# Patient Record
Sex: FEMALE | Race: WHITE | URBAN - METROPOLITAN AREA
[De-identification: names, ages, dates, MRNs, and addresses within clinical notes are randomized per-mention and may not be internally consistent; named-entity substitution may affect disease eponyms.]

---

## 2020-10-02 ENCOUNTER — HISTORICAL (OUTPATIENT)
Dept: RADIOLOGY | Facility: HOSPITAL | Age: 80
End: 2020-10-02

## 2021-06-02 ENCOUNTER — HISTORICAL (OUTPATIENT)
Dept: INTENSIVE CARE | Facility: HOSPITAL | Age: 81
End: 2021-06-02

## 2021-06-14 ENCOUNTER — HISTORICAL (OUTPATIENT)
Dept: WOUND CARE | Facility: HOSPITAL | Age: 81
End: 2021-06-14

## 2021-06-14 ENCOUNTER — HISTORICAL (OUTPATIENT)
Dept: RADIOLOGY | Facility: HOSPITAL | Age: 81
End: 2021-06-14

## 2022-04-11 ENCOUNTER — HISTORICAL (OUTPATIENT)
Dept: ADMINISTRATIVE | Facility: HOSPITAL | Age: 82
End: 2022-04-11

## 2022-04-29 VITALS
DIASTOLIC BLOOD PRESSURE: 78 MMHG | SYSTOLIC BLOOD PRESSURE: 168 MMHG | HEIGHT: 63 IN | BODY MASS INDEX: 37.57 KG/M2 | WEIGHT: 212.06 LBS

## 2023-07-24 ENCOUNTER — HOSPITAL ENCOUNTER (EMERGENCY)
Facility: HOSPITAL | Age: 83
Discharge: HOME | End: 2023-07-24
Attending: EMERGENCY MEDICINE
Payer: MEDICARE

## 2023-07-24 VITALS
TEMPERATURE: 99 F | HEIGHT: 64 IN | WEIGHT: 200 LBS | DIASTOLIC BLOOD PRESSURE: 74 MMHG | BODY MASS INDEX: 34.15 KG/M2 | HEART RATE: 64 BPM | OXYGEN SATURATION: 97 % | SYSTOLIC BLOOD PRESSURE: 176 MMHG | RESPIRATION RATE: 24 BRPM

## 2023-07-24 DIAGNOSIS — I87.2 VENOUS STASIS DERMATITIS OF BOTH LOWER EXTREMITIES: ICD-10-CM

## 2023-07-24 DIAGNOSIS — S32.058A OTHER CLOSED FRACTURE OF FIFTH LUMBAR VERTEBRA, INITIAL ENCOUNTER: ICD-10-CM

## 2023-07-24 DIAGNOSIS — E86.0 DEHYDRATION: ICD-10-CM

## 2023-07-24 DIAGNOSIS — S09.90XA CLOSED HEAD INJURY, INITIAL ENCOUNTER: ICD-10-CM
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
## 2023-07-24 PROBLEM — S32.009A CLOSED FRACTURE OF SPINOUS PROCESS OF LUMBAR VERTEBRA: Status: ACTIVE | Noted: 2023-07-24
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
## 2023-07-24 LAB
ABORH RETYPE: NORMAL
ALBUMIN SERPL-MCNC: 3.7 G/DL (ref 3.4–4.8)
ALBUMIN/GLOB SERPL: 1.2 RATIO (ref 1.1–2)
ALP SERPL-CCNC: 84 UNIT/L (ref 40–150)
ALT SERPL-CCNC: 21 UNIT/L (ref 0–55)
APTT PPP: 32.3 SECONDS (ref 23.2–33.7)
AST SERPL-CCNC: 19 UNIT/L (ref 5–34)
BASOPHILS # BLD AUTO: 0.02 X10(3)/MCL
BASOPHILS NFR BLD AUTO: 0.3 %
BILIRUBIN DIRECT+TOT PNL SERPL-MCNC: 0.3 MG/DL
BUN SERPL-MCNC: 27.9 MG/DL (ref 9.8–20.1)
CALCIUM SERPL-MCNC: 9.7 MG/DL (ref 8.4–10.2)
CHLORIDE SERPL-SCNC: 104 MMOL/L (ref 98–107)
CO2 SERPL-SCNC: 25 MMOL/L (ref 23–31)
CREAT SERPL-MCNC: 0.99 MG/DL (ref 0.55–1.02)
EOSINOPHIL # BLD AUTO: 0.08 X10(3)/MCL (ref 0–0.9)
EOSINOPHIL NFR BLD AUTO: 1.2 %
ERYTHROCYTE [DISTWIDTH] IN BLOOD BY AUTOMATED COUNT: 14.3 % (ref 11.5–17)
ETHANOL SERPL-MCNC: <10 MG/DL
GFR SERPLBLD CREATININE-BSD FMLA CKD-EPI: 58 MLS/MIN/1.73/M2
GLOBULIN SER-MCNC: 3.1 GM/DL (ref 2.4–3.5)
GLUCOSE SERPL-MCNC: 155 MG/DL (ref 82–115)
GROUP & RH: NORMAL
HCT VFR BLD AUTO: 35.6 % (ref 37–47)
HGB BLD-MCNC: 11.3 G/DL (ref 12–16)
IMM GRANULOCYTES # BLD AUTO: 0.03 X10(3)/MCL (ref 0–0.04)
IMM GRANULOCYTES NFR BLD AUTO: 0.4 %
INDIRECT COOMBS GEL: NORMAL
INR BLD: 1.13 (ref 0–1.3)
LACTATE SERPL-SCNC: 1.4 MMOL/L (ref 0.5–2.2)
LACTATE SERPL-SCNC: 2.5 MMOL/L (ref 0.5–2.2)
LACTATE SERPL-SCNC: 2.6 MMOL/L (ref 0.5–2.2)
LYMPHOCYTES # BLD AUTO: 1.33 X10(3)/MCL (ref 0.6–4.6)
LYMPHOCYTES NFR BLD AUTO: 19.6 %
MCH RBC QN AUTO: 27.3 PG (ref 27–31)
MCHC RBC AUTO-ENTMCNC: 31.7 G/DL (ref 33–36)
MCV RBC AUTO: 86 FL (ref 80–94)
MONOCYTES # BLD AUTO: 0.46 X10(3)/MCL (ref 0.1–1.3)
MONOCYTES NFR BLD AUTO: 6.8 %
NEUTROPHILS # BLD AUTO: 4.85 X10(3)/MCL (ref 2.1–9.2)
NEUTROPHILS NFR BLD AUTO: 71.7 %
NRBC BLD AUTO-RTO: 0 %
PLATELET # BLD AUTO: 305 X10(3)/MCL (ref 130–400)
PMV BLD AUTO: 9.2 FL (ref 7.4–10.4)
POTASSIUM SERPL-SCNC: 4.4 MMOL/L (ref 3.5–5.1)
PROT SERPL-MCNC: 6.8 GM/DL (ref 5.8–7.6)
PROTHROMBIN TIME: 14.4 SECONDS (ref 12.5–14.5)
RBC # BLD AUTO: 4.14 X10(6)/MCL (ref 4.2–5.4)
SODIUM SERPL-SCNC: 139 MMOL/L (ref 136–145)
SPECIMEN OUTDATE: NORMAL
WBC # SPEC AUTO: 6.77 X10(3)/MCL (ref 4.5–11.5)

## 2023-07-24 PROCEDURE — 80053 COMPREHEN METABOLIC PANEL: CPT | Performed by: EMERGENCY MEDICINE

## 2023-07-24 PROCEDURE — 96361 HYDRATE IV INFUSION ADD-ON: CPT

## 2023-07-24 PROCEDURE — 83605 ASSAY OF LACTIC ACID: CPT | Mod: 59 | Performed by: EMERGENCY MEDICINE

## 2023-07-24 PROCEDURE — G0390 TRAUMA RESPONS W/HOSP CRITI: HCPCS

## 2023-07-24 PROCEDURE — 63600175 PHARM REV CODE 636 W HCPCS: Performed by: EMERGENCY MEDICINE

## 2023-07-24 PROCEDURE — 85025 COMPLETE CBC W/AUTO DIFF WBC: CPT | Performed by: EMERGENCY MEDICINE

## 2023-07-24 PROCEDURE — 82077 ASSAY SPEC XCP UR&BREATH IA: CPT | Performed by: EMERGENCY MEDICINE

## 2023-07-24 PROCEDURE — 99285 EMERGENCY DEPT VISIT HI MDM: CPT | Mod: 25

## 2023-07-24 PROCEDURE — 86900 BLOOD TYPING SEROLOGIC ABO: CPT | Performed by: EMERGENCY MEDICINE

## 2023-07-24 PROCEDURE — 96365 THER/PROPH/DIAG IV INF INIT: CPT

## 2023-07-24 PROCEDURE — 25000003 PHARM REV CODE 250: Performed by: EMERGENCY MEDICINE

## 2023-07-24 PROCEDURE — 85610 PROTHROMBIN TIME: CPT | Performed by: EMERGENCY MEDICINE

## 2023-07-24 PROCEDURE — 85730 THROMBOPLASTIN TIME PARTIAL: CPT | Performed by: EMERGENCY MEDICINE

## 2023-07-24 RX ORDER — NAPROXEN 500 MG/1
500 TABLET ORAL 2 TIMES DAILY PRN
Qty: 20 TABLET | Refills: 0 | Status: SHIPPED | OUTPATIENT
Start: 2023-07-24

## 2023-07-24 RX ORDER — ACETAMINOPHEN 10 MG/ML
1000 INJECTION, SOLUTION INTRAVENOUS ONCE
Status: COMPLETED | OUTPATIENT
Start: 2023-07-24 | End: 2023-07-24

## 2023-07-24 RX ORDER — SODIUM CHLORIDE 9 MG/ML
1000 INJECTION, SOLUTION INTRAVENOUS
Status: DISCONTINUED | OUTPATIENT
Start: 2023-07-24 | End: 2023-07-24

## 2023-07-24 RX ORDER — HYDROCODONE BITARTRATE AND ACETAMINOPHEN 5; 325 MG/1; MG/1
1 TABLET ORAL EVERY 6 HOURS PRN
Qty: 8 TABLET | Refills: 0 | Status: SHIPPED | OUTPATIENT
Start: 2023-07-24

## 2023-07-24 RX ORDER — HYDROCODONE BITARTRATE AND ACETAMINOPHEN 5; 325 MG/1; MG/1
1 TABLET ORAL
Status: COMPLETED | OUTPATIENT
Start: 2023-07-24 | End: 2023-07-24

## 2023-07-24 RX ADMIN — SODIUM CHLORIDE 500 ML: 9 INJECTION, SOLUTION INTRAVENOUS at 11:07

## 2023-07-24 RX ADMIN — SODIUM CHLORIDE 1000 ML: 9 INJECTION, SOLUTION INTRAVENOUS at 12:07

## 2023-07-24 RX ADMIN — HYDROCODONE BITARTRATE AND ACETAMINOPHEN 1 TABLET: 5; 325 TABLET ORAL at 04:07

## 2023-07-24 RX ADMIN — ACETAMINOPHEN 1000 MG: 10 INJECTION, SOLUTION INTRAVENOUS at 09:07

## 2023-07-24 NOTE — CONSULTS
[facility] - Emergency Dept  Trauma Surgery  Consult Note    Patient Name: Nicolasa Montano  MRN: [id number]  Code Status: No Order  Admission Date: 7/24/2023  Hospital Length of Stay: 0 days  Attending Physician: Sapna Barry MD  Primary Care Provider: Primary Doctor No    Patient information was obtained from patient and ER records.     Consults  Subjective:     Principal Problem: <principal problem not specified>    History of Present Illness: 81-year-old female who tripped and fell last night presented as a level 2 trauma activation.  Patient does take Plavix although the indication is not completely clear at this time.  She complains of tailbone pain.  She was no deformity or step-off it was slightly tender in that area.  Her exam is otherwise normal.  She was no signs of trauma of the head.  She does not report loss of consciousness.      No current facility-administered medications on file prior to encounter.     No current outpatient medications on file prior to encounter.       Review of patient's allergies indicates:  No Known Allergies    History reviewed. No pertinent past medical history.  History reviewed. No pertinent surgical history.  Family History    None       Tobacco Use    Smoking status: Not on file    Smokeless tobacco: Not on file   Substance and Sexual Activity    Alcohol use: Not on file    Drug use: Not on file    Sexual activity: Not on file     Review of Systems   Constitutional:  Negative for chills and fever.   HENT:  Negative for ear pain and trouble swallowing.    Eyes:  Negative for pain and redness.   Respiratory:  Negative for cough and chest tightness.    Cardiovascular:  Negative for chest pain, palpitations and leg swelling.   Gastrointestinal:  Negative for abdominal distention, abdominal pain, nausea and vomiting.   Genitourinary:  Negative for difficulty urinating.   Musculoskeletal:  Positive for back pain. Negative for neck pain.   Skin:   Negative for color change, pallor and wound.   Neurological:  Negative for dizziness, syncope, speech difficulty, weakness, light-headedness, numbness and headaches.   Psychiatric/Behavioral:  Negative for agitation and suicidal ideas.    All other systems reviewed and are negative.  Objective:     Vital Signs (Most Recent):  Temp: 98.7 °F (37.1 °C) (07/24/23 1010)  Pulse: 62 (07/24/23 1010)  Resp: 19 (07/24/23 1010)  BP: (!) 176/72 (07/24/23 1010)  SpO2: 97 % (07/24/23 1010) Vital Signs (24h Range):  Temp:  [97.4 °F (36.3 °C)-98.7 °F (37.1 °C)] 98.7 °F (37.1 °C)  Pulse:  [62-80] 62  Resp:  [16-19] 19  SpO2:  [97 %-98 %] 97 %  BP: (127-176)/(72-83) 176/72     Weight: 90.7 kg (200 lb)  Body mass index is 34.33 kg/m².     Physical Exam  Constitutional:       Appearance: Normal appearance.   HENT:      Head: Normocephalic and atraumatic.      Nose: Nose normal.   Eyes:      Pupils: Pupils are equal, round, and reactive to light.   Cardiovascular:      Rate and Rhythm: Normal rate.      Pulses: Normal pulses.      Comments: Normal peripheral pulses  Pulmonary:      Effort: Pulmonary effort is normal. No respiratory distress.   Chest:      Chest wall: No tenderness.   Abdominal:      General: Abdomen is flat. Bowel sounds are normal. There is no distension.      Palpations: Abdomen is soft.      Tenderness: There is no abdominal tenderness.   Musculoskeletal:         General: Tenderness present. No swelling, deformity or signs of injury.      Cervical back: Normal range of motion and neck supple. No tenderness.   Skin:     General: Skin is warm and dry.      Capillary Refill: Capillary refill takes less than 2 seconds.      Findings: No lesion.   Neurological:      General: No focal deficit present.      Mental Status: She is alert and oriented to person, place, and time. Mental status is at baseline.   Psychiatric:         Mood and Affect: Mood normal.         Behavior: Behavior normal.         Thought Content: Thought  content normal.         Judgment: Judgment normal.          I have reviewed all pertinent lab results within the past 24 hours.    Significant Diagnostics:  I have reviewed all pertinent imaging results/findings within the past 24 hours.      Assessment/Plan:     Closed fracture of spinous process of lumbar vertebra  Cleared from trauma standpoint for dispositioned by emergency department physician.  Imaging reviewed.  Labs reviewed.      VTE Risk Mitigation (From admission, onward)    None          Thank you for your consult. I will sign off. Please contact us if you have any additional questions.    Gordon Trinidad, MIKE  Trauma Surgery  [facility] - Emergency Dept

## 2023-07-24 NOTE — HPI
81-year-old female who tripped and fell last night presented as a level 2 trauma activation.  Patient does take Plavix although the indication is not completely clear at this time.  She complains of tailbone pain.  She was no deformity or step-off it was slightly tender in that area.  Her exam is otherwise normal.  She was no signs of trauma of the head.  She does not report loss of consciousness.

## 2023-07-24 NOTE — ED PROVIDER NOTES
Encounter Date: 7/24/2023    SCRIBE #1 NOTE: I, Gordon Mcmullen, am scribing for, and in the presence of,  Sapna Barry MD. I have scribed the following portions of the note - Other sections scribed: HPI, ROS, PE.     History   No chief complaint on file.    80 y/o female presents to the ED via EMS as a Level 2 trauma following a fall yesterday night. EMS reports that fell backwards in the bathroom and hit the back of her head on the floor, no LOC. She was reported to be altered today with an abnormal gait. Pt reports tailbone pain rated 9/10, unsure if she hit it when she fell. She notes that the fall occurred because she lost her balance. Does have a history of falls. On thinners.    The history is provided by the EMS personnel and the patient. No  was used.   Review of patient's allergies indicates:  No Known Allergies  History reviewed. No pertinent past medical history.  History reviewed. No pertinent surgical history.  History reviewed. No pertinent family history.     Review of Systems   Constitutional:  Negative for activity change, diaphoresis, fatigue and fever.   HENT:  Negative for congestion, postnasal drip, rhinorrhea, sinus pain, sneezing and sore throat.    Respiratory:  Negative for cough, chest tightness, shortness of breath and wheezing.    Cardiovascular:  Negative for chest pain, palpitations and leg swelling.   Gastrointestinal:  Negative for abdominal distention, abdominal pain and blood in stool.   Genitourinary:  Negative for decreased urine volume, difficulty urinating and dysuria.   Musculoskeletal:         Tailbone pain   Skin:  Negative for color change and pallor.   Neurological:  Negative for dizziness, speech difficulty, weakness, light-headedness and numbness.   All other systems reviewed and are negative.    Physical Exam     Initial Vitals [07/24/23 0943]   BP Pulse Resp Temp SpO2   (!) 171/83 80 19 98.4 °F (36.9 °C) 97 %      MAP       --         Physical  Exam    Nursing note and vitals reviewed.  Constitutional: She appears well-developed and well-nourished. She is not diaphoretic. No distress. Cervical collar in place.   Bilateral breath sounds     HENT:   Head: Normocephalic and atraumatic.   Nose: Nose normal.   Mouth/Throat: Oropharynx is clear and moist.   Eyes: Conjunctivae and EOM are normal. Pupils are equal, round, and reactive to light.   Pupils 3 mm   Neck: Trachea normal.   C-collar replaced   Cardiovascular:  Normal rate, regular rhythm, normal heart sounds and intact distal pulses.           No murmur heard.  Pulmonary/Chest: Breath sounds normal. No respiratory distress. She has no wheezes. She has no rhonchi. She has no rales. She exhibits no tenderness.   Abdominal: Abdomen is soft. Bowel sounds are normal. She exhibits no distension and no mass. There is no abdominal tenderness. There is no rebound and no guarding.   Musculoskeletal:         General: Tenderness present. No edema. Normal range of motion.      Lumbar back: Normal. Normal range of motion.      Comments: Sacral tenderness  No step offs or deformities to the back     Neurological: She is alert and oriented to person, place, and time. She has normal strength. No cranial nerve deficit or sensory deficit. GCS score is 15. GCS eye subscore is 4. GCS verbal subscore is 5. GCS motor subscore is 6.   Skin: Skin is warm and dry. Capillary refill takes less than 2 seconds. No abscess noted. No pallor.   Stasis dermatitis to BLE   Psychiatric: She has a normal mood and affect. Her behavior is normal. Judgment and thought content normal.       ED Course   Procedures  Labs Reviewed   COMPREHENSIVE METABOLIC PANEL - Abnormal; Notable for the following components:       Result Value    Glucose Level 155 (*)     Blood Urea Nitrogen 27.9 (*)     All other components within normal limits   LACTIC ACID, PLASMA - Abnormal; Notable for the following components:    Lactic Acid Level 2.5 (*)     All other  components within normal limits   CBC WITH DIFFERENTIAL - Abnormal; Notable for the following components:    RBC 4.14 (*)     Hgb 11.3 (*)     Hct 35.6 (*)     MCHC 31.7 (*)     All other components within normal limits   LACTIC ACID, PLASMA - Abnormal; Notable for the following components:    Lactic Acid Level 2.6 (*)     All other components within normal limits   PROTIME-INR - Normal   APTT - Normal   ALCOHOL,MEDICAL (ETHANOL) - Normal   LACTIC ACID, PLASMA - Normal   CBC W/ AUTO DIFFERENTIAL    Narrative:     The following orders were created for panel order CBC auto differential.  Procedure                               Abnormality         Status                     ---------                               -----------         ------                     CBC with Differential[[id number]]        Abnormal            Final result                 Please view results for these tests on the individual orders.   TYPE & SCREEN   ABORH RETYPE          Imaging Results              X-Ray Pelvis Routine AP (Final result)  Result time 07/24/23 10:41:01      Final result by Lawrence Montes De Oca MD (07/24/23 10:41:01)                   Impression:      1. No definite acute displaced fracture or dislocation identified.      Electronically signed by: Lawrence Montes De Oca MD  Date:    07/24/2023  Time:    10:41               Narrative:    EXAMINATION:  XR PELVIS ROUTINE AP    CLINICAL HISTORY:  Pelvic trauma.    TECHNIQUE:  AP view of the pelvis was performed.    COMPARISON:  None.    FINDINGS:  No acute displaced fracture, subluxation, or dislocation identified.  There are suspected chronic healed fractures of the bilateral superior and inferior pubic rami.  There are degenerative changes noted of the bilateral hips.  There is vascular stent material present.                                       X-Ray Chest 1 View (Final result)  Result time 07/24/23 10:39:24      Final result by Lawrence Montes De Oca MD (07/24/23 10:39:24)                    Impression:      1. There are diffuse bilateral interstitial opacities identified.  Correlate for pulmonary edema.  Differential would include chronic interstitial change or infection.  No prior imaging available for comparison.      Electronically signed by: Lawrence Montes De Oca MD  Date:    07/24/2023  Time:    10:39               Narrative:    EXAMINATION:  XR CHEST 1 VIEW    CLINICAL HISTORY:  Trauma.    TECHNIQUE:  1 view of the chest.    COMPARISON:  None available.    FINDINGS:  There are diffuse bilateral interstitial opacities.  Correlate for pulmonary edema.  Chronic interstitial changes or infection would be within the differential.  No prior imaging available for comparison..  There is no pneumothorax.  There is no significant pleural fluid identified.  Vascular calcifications are present.  The cardiac silhouette is enlarged.    No acute displaced fracture is seen.  There is partially visualized cervical fusion hardware.                                       CT Head Without Contrast (Final result)  Result time 07/24/23 10:15:17      Final result by Arnulfo Cosby MD (07/24/23 10:15:17)                   Impression:      No acute intracranial findings identified.      Electronically signed by: Arnulfo Cosby  Date:    07/24/2023  Time:    10:15               Narrative:    EXAMINATION:  CT HEAD WITHOUT CONTRAST    CLINICAL HISTORY:  Trauma;    TECHNIQUE:  Sequential axial images were performed of the brain without contrast.    Dose product length of 1226 mGycm. Automated exposure control was utilized to minimize radiation dose.    COMPARISON:  None.    FINDINGS:  There is no intracranial mass effect, midline shift, hydrocephalus or hemorrhage. There is no sulcal effacement or low attenuation changes to suggest recent large vessel territory infarction. Chronic appearing periventricular and subcortical white matter low attenuation changes are present and are consistent with chronic microangiopathic ischemia. The  ventricular system and sulcal markings prominence is consistent with atrophy. There is no acute extra axial fluid collection.  There is no acute depressed calvarial fracture.  Visualized paranasal sinuses are clear without mucosal thickening, polypoidal abnormality or air-fluid levels. Mastoid air cells aeration is optimal.                                       CT Lumbar Spine Without Contrast (Final result)  Result time 07/24/23 10:27:17      Final result by Arnulfo Cosby MD (07/24/23 10:27:17)                   Impression:      1. Fracture L5 spinous process with mild adjacent paraspinal soft tissue inflammations.    2. Degenerative disc disease and spondylosis level by level discussed above.      Electronically signed by: Arnulfo Cosby  Date:    07/24/2023  Time:    10:27               Narrative:    EXAMINATION:  CT LUMBAR SPINE WITHOUT CONTRAST    CLINICAL HISTORY:  Low back pain, increased fracture risk;Trauma. Fall on blood thinners. Pt reports sacral tendernes on palpation.;    TECHNIQUE:  Multidetector axial images were performed of the lumbar spine without contrast and the images were reformatted.    Dose length product of 119 mGycm. Automated exposure control was utilized to minimize radiation dose.    COMPARISON:  None available    FINDINGS:  There is Schmorl node defect causing mild compression along the superior endplate of L1.  Otherwise, lumbar vertebrae stature preserved and alignment is unremarkable.  There is fracture deformity of the spinous process of L5 with mild adjacent soft tissue inflammations on image 90 series 6.  No acute fracture or malalignment identified.  Right kidney cortical scarring.  Calcified plaques of the aorta.  Left iliac stent graft.  Disc segmental analysis is given below:    At L1-L2, disc height is preserved.  Central canal is not stenosed and there are no narrowings of the neural foramen.    At L2-L3, there is bulging of annulus fibrosis which slightly indents the  ventral thecal sac.  Central canal is not stenosed.  There are no narrowings of the neural foramen.    At L3-L4, there is generalized disc bulge which compresses the ventral thecal sac.  There is also ligamentum flavum thickening and facet arthropathy.  These findings combine to cause moderate central canal stenosis.  There are no narrowings of the neural foramen.    At L4-L5, there is broad disc protrusion, ligamentum flavum thickening and facet arthropathy resulting in marked central canal stenosis.  Bilateral effacement of the lateral recesses.  There is mild narrowing of the proximal right neural foramen and moderate narrowing of proximal left neural foramen.    At L5-S1, disc height is lost with vacuum disc phenomena.  There is generalized disc bulge which mildly compresses the ventral thecal sac which is flattened.  Bilateral facet arthropathy and ligamentum flavum thickening.  Central canal stenosis is moderate.  There is mild narrowing of the proximal right neural foramen.  The left neural foramen is patent.                                       CT Cervical Spine Without Contrast (Final result)  Result time 07/24/23 10:18:48      Final result by Arnulfo Cosby MD (07/24/23 10:18:48)                   Impression:      No acute fracture or malalignment identified.      Electronically signed by: Arnulfo Cosby  Date:    07/24/2023  Time:    10:18               Narrative:    EXAMINATION:  CT CERVICAL SPINE WITHOUT CONTRAST    CLINICAL HISTORY:  Trauma.    TECHNIQUE:  Multidetector axial images were performed of the cervical spine without and.  Images were reconstructed.    Automated exposure control was utilized to minimize radiation dose.  DLP 1226.    COMPARISON:  None available.    FINDINGS:  Patient is status post solid ACDF from C4 through C7.  There is trace of grade 1 degenerative anterolisthesis of C7 on T1.  Chronic irregularity of the inferior endplate of C3.  Otherwise, cervical vertebrae stature is  preserved.  No acute fracture or malalignment identified.  There is posterior vertebral spondylosis at C3-C4 without significant central canal stenosis or narrowings of the neural foramen. There is no prevertebral soft tissue prominence.    This study does not exclude the possibility of intrathecal soft tissue, ligamentous or vascular injury.                                       Medications   acetaminophen 1,000 mg/100 mL (10 mg/mL) injection 1,000 mg (0 mg Intravenous Stopped 7/24/23 1011)   sodium chloride 0.9% bolus 1,000 mL 1,000 mL (0 mLs Intravenous Stopped 7/24/23 1345)   HYDROcodone-acetaminophen 5-325 mg per tablet 1 tablet (1 tablet Oral Given 7/24/23 1613)     Medical Decision Making:   History:   I obtained history from: EMS provider.  Initial Assessment:   See hpi  Independently Interpreted Test(s):   I have ordered and independently interpreted X-rays - see prior notes.  Clinical Tests:   Lab Tests: Ordered and Reviewed  Radiological Study: Ordered and Reviewed  Other:   I have discussed this case with another health care provider.        Scribe Attestation:   Scribe #1: I performed the above scribed service and the documentation accurately describes the services I performed. I attest to the accuracy of the note.  Comments: Attending:   Physician Attestation Statement for Scribe #1: I, Sapna Barry MD, personally performed the services described in this documentation. All medical record entries made by the scribe were at my direction and in my presence.  I have reviewed the chart and agree that the record reflects my personal performance and is accurate and complete.        Attending Attestation:           Physician Attestation for Scribe:  Physician Attestation Statement for Scribe #1: I, Sapna Barry MD, reviewed documentation, as scribed by Gordon Mcmullen in my presence, and it is both accurate and complete.       Medical Decision Making  The differential diagnosis includes, but is not  limited to: intracranial hemorrhage, skull fracture, concussion, spinal fracture.  Cbc, cmp, lactic, ua, coags, ct head, c spine, cxr, lumbar spine ct  ordered and reviewed  Xr read as possible chronic scarring vs edema vs infection, she has no ss any. Labs other than lactic unremarkable, she has florentin bdominal pain or complaints. L spine with spinous fracture, will place lso for comfort  Given small amount of ivf with clearance of lactic acidosis, had lengthy discussion with patient and her daughter regarding her presentation and they state she has truly only had 4 falls in 2 years due to her neuropathy, they deny any recent concerning symptoms, she is neuro intact. Will f/u wit hpcp    Problems Addressed:  Closed head injury, initial encounter: acute illness or injury that poses a threat to life or bodily functions  Dehydration: acute illness or injury that poses a threat to life or bodily functions  Other closed fracture of fifth lumbar vertebra, initial encounter: acute illness or injury that poses a threat to life or bodily functions  Other slipping, tripping and stumbling without falling, initial encounter: acute illness or injury that poses a threat to life or bodily functions  Venous stasis dermatitis of both lower extremities: chronic illness or injury    Amount and/or Complexity of Data Reviewed  Independent Historian: EMS  Labs: ordered.  Radiology: ordered and independent interpretation performed.    Risk  OTC drugs.  Prescription drug management.           ED Course as of 07/25/23 0557   Mon Jul 24, 2023   1019 Lactate, Danial(!): 2.5  She has no abdominal tenderness or any abdominal injury, she has had no hemodynamic instability, will give small ivf bolus [BS]   1057 Collar cleared, feels quite well [BS]   1058 Pain improved with tylenol [BS]   1210 Resting comfortably, needs to urinate, awaiting lso brace [BS]   1230 She clinically does not have signs of overload or pneumonia. Her cxr is not very  impressive  [BS]   1243 Discussed with family who agrees with plan [BS]   1533 Lactic acid has normalized. Will proceed with discharge as planned  [KM]      ED Course User Index  [BS] Sapna Barry MD  [KM] Juliana Segovia MD                   Clinical Impression:   Final diagnoses:  [W18.49XA] Other slipping, tripping and stumbling without falling, initial encounter (Primary)  [S32.058A] Other closed fracture of fifth lumbar vertebra, initial encounter  [E86.0] Dehydration  [S09.90XA] Closed head injury, initial encounter  [I87.2] Venous stasis dermatitis of both lower extremities        ED Disposition Condition    Discharge Stable          ED Prescriptions       Medication Sig Dispense Start Date End Date Auth. Provider    HYDROcodone-acetaminophen (NORCO) 5-325 mg per tablet Take 1 tablet by mouth every 6 (six) hours as needed for Pain (severe pain only). 8 tablet 7/24/2023 -- Sapna Barry MD    naproxen (NAPROSYN) 500 MG tablet Take 1 tablet (500 mg total) by mouth 2 (two) times daily as needed (take with food or milk for mild-moderate pain). 20 tablet 7/24/2023 -- Sapna Barry MD          Follow-up Information       Follow up With Specialties Details Why Contact Info    your primary care provider  Schedule an appointment as soon as possible for a visit       [facility] - Emergency Dept Emergency Medicine  As needed, If symptoms worsen [address] [address]  [phone]             Sapna Barry MD  07/25/23 0557

## 2023-07-24 NOTE — SUBJECTIVE & OBJECTIVE
Encounter still open from 11/14/17. Please review. No current facility-administered medications on file prior to encounter.     No current outpatient medications on file prior to encounter.       Review of patient's allergies indicates:  No Known Allergies    History reviewed. No pertinent past medical history.  History reviewed. No pertinent surgical history.  Family History    None       Tobacco Use    Smoking status: Not on file    Smokeless tobacco: Not on file   Substance and Sexual Activity    Alcohol use: Not on file    Drug use: Not on file    Sexual activity: Not on file     Review of Systems   Constitutional:  Negative for chills and fever.   HENT:  Negative for ear pain and trouble swallowing.    Eyes:  Negative for pain and redness.   Respiratory:  Negative for cough and chest tightness.    Cardiovascular:  Negative for chest pain, palpitations and leg swelling.   Gastrointestinal:  Negative for abdominal distention, abdominal pain, nausea and vomiting.   Genitourinary:  Negative for difficulty urinating.   Musculoskeletal:  Positive for back pain. Negative for neck pain.   Skin:  Negative for color change, pallor and wound.   Neurological:  Negative for dizziness, syncope, speech difficulty, weakness, light-headedness, numbness and headaches.   Psychiatric/Behavioral:  Negative for agitation and suicidal ideas.    All other systems reviewed and are negative.  Objective:     Vital Signs (Most Recent):  Temp: 98.7 °F (37.1 °C) (07/24/23 1010)  Pulse: 62 (07/24/23 1010)  Resp: 19 (07/24/23 1010)  BP: (!) 176/72 (07/24/23 1010)  SpO2: 97 % (07/24/23 1010) Vital Signs (24h Range):  Temp:  [97.4 °F (36.3 °C)-98.7 °F (37.1 °C)] 98.7 °F (37.1 °C)  Pulse:  [62-80] 62  Resp:  [16-19] 19  SpO2:  [97 %-98 %] 97 %  BP: (127-176)/(72-83) 176/72     Weight: 90.7 kg (200 lb)  Body mass index is 34.33 kg/m².     Physical Exam  Constitutional:       Appearance: Normal appearance.   HENT:      Head: Normocephalic and atraumatic.      Nose: Nose normal.   Eyes:       Pupils: Pupils are equal, round, and reactive to light.   Cardiovascular:      Rate and Rhythm: Normal rate.      Pulses: Normal pulses.      Comments: Normal peripheral pulses  Pulmonary:      Effort: Pulmonary effort is normal. No respiratory distress.   Chest:      Chest wall: No tenderness.   Abdominal:      General: Abdomen is flat. Bowel sounds are normal. There is no distension.      Palpations: Abdomen is soft.      Tenderness: There is no abdominal tenderness.   Musculoskeletal:         General: Tenderness present. No swelling, deformity or signs of injury.      Cervical back: Normal range of motion and neck supple. No tenderness.   Skin:     General: Skin is warm and dry.      Capillary Refill: Capillary refill takes less than 2 seconds.      Findings: No lesion.   Neurological:      General: No focal deficit present.      Mental Status: She is alert and oriented to person, place, and time. Mental status is at baseline.   Psychiatric:         Mood and Affect: Mood normal.         Behavior: Behavior normal.         Thought Content: Thought content normal.         Judgment: Judgment normal.          I have reviewed all pertinent lab results within the past 24 hours.    Significant Diagnostics:  I have reviewed all pertinent imaging results/findings within the past 24 hours.

## 2023-07-24 NOTE — ASSESSMENT & PLAN NOTE
Cleared from trauma standpoint for dispositioned by emergency department physician.  Imaging reviewed.  Labs reviewed.

## 2024-08-14 ENCOUNTER — HOSPITAL ENCOUNTER (INPATIENT)
Facility: HOSPITAL | Age: 84
LOS: 5 days | Discharge: NURSING, REHAB OR LONG-TERM CARE | DRG: 964 | End: 2024-08-19
Attending: EMERGENCY MEDICINE | Admitting: SURGERY
Payer: MEDICARE

## 2024-08-14 DIAGNOSIS — S06.5X1A TRAUMATIC SUBDURAL HEMATOMA WITH LOSS OF CONSCIOUSNESS OF 30 MINUTES OR LESS, INITIAL ENCOUNTER: Primary | ICD-10-CM

## 2024-08-14 DIAGNOSIS — S06.6X1A TRAUMATIC SUBARACHNOID HEMORRHAGE WITH LOSS OF CONSCIOUSNESS OF 30 MINUTES OR LESS, INITIAL ENCOUNTER: ICD-10-CM

## 2024-08-14 DIAGNOSIS — S22.41XA CLOSED FRACTURE OF MULTIPLE RIBS OF RIGHT SIDE, INITIAL ENCOUNTER: ICD-10-CM

## 2024-08-14 DIAGNOSIS — H05.231 PERIORBITAL HEMATOMA OF RIGHT EYE: ICD-10-CM

## 2024-08-14 DIAGNOSIS — J93.9 PNEUMOTHORAX, RIGHT: ICD-10-CM

## 2024-08-14 PROBLEM — S22.41XD CLOSED FRACTURE OF MULTIPLE RIBS OF RIGHT SIDE WITH ROUTINE HEALING: Status: ACTIVE | Noted: 2024-08-14

## 2024-08-14 PROBLEM — S06.5XAA SDH (SUBDURAL HEMATOMA): Status: ACTIVE | Noted: 2024-08-14

## 2024-08-14 PROBLEM — I10 HYPERTENSION: Status: ACTIVE | Noted: 2024-08-14

## 2024-08-14 PROBLEM — I60.9 SAH (SUBARACHNOID HEMORRHAGE): Status: ACTIVE | Noted: 2024-08-14

## 2024-08-14 PROBLEM — S42.001D: Status: ACTIVE | Noted: 2024-08-14

## 2024-08-14 LAB
ABORH RETYPE: NORMAL
ALBUMIN SERPL-MCNC: 3.7 G/DL (ref 3.4–4.8)
ALBUMIN/GLOB SERPL: 0.9 RATIO (ref 1.1–2)
ALP SERPL-CCNC: 85 UNIT/L (ref 40–150)
ALT SERPL-CCNC: 18 UNIT/L (ref 0–55)
AMPHET UR QL SCN: NEGATIVE
ANION GAP SERPL CALC-SCNC: 12 MEQ/L
APTT PPP: 28.7 SECONDS (ref 23.2–33.7)
AST SERPL-CCNC: 26 UNIT/L (ref 5–34)
BACTERIA #/AREA URNS AUTO: ABNORMAL /HPF
BARBITURATE SCN PRESENT UR: NEGATIVE
BASOPHILS # BLD AUTO: 0.04 X10(3)/MCL
BASOPHILS NFR BLD AUTO: 0.3 %
BENZODIAZ UR QL SCN: NEGATIVE
BILIRUB SERPL-MCNC: 0.3 MG/DL
BILIRUB UR QL STRIP.AUTO: NEGATIVE
BUN SERPL-MCNC: 30 MG/DL (ref 9.8–20.1)
CALCIUM SERPL-MCNC: 9.9 MG/DL (ref 8.4–10.2)
CANNABINOIDS UR QL SCN: NEGATIVE
CHLORIDE SERPL-SCNC: 108 MMOL/L (ref 98–107)
CK SERPL-CCNC: 388 U/L (ref 29–168)
CLARITY UR: CLEAR
CO2 SERPL-SCNC: 21 MMOL/L (ref 23–31)
COCAINE UR QL SCN: NEGATIVE
COLOR UR AUTO: ABNORMAL
CREAT SERPL-MCNC: 1.36 MG/DL (ref 0.55–1.02)
CREAT/UREA NIT SERPL: 22
EOSINOPHIL # BLD AUTO: 0.08 X10(3)/MCL (ref 0–0.9)
EOSINOPHIL NFR BLD AUTO: 0.6 %
ERYTHROCYTE [DISTWIDTH] IN BLOOD BY AUTOMATED COUNT: 13.4 % (ref 11.5–17)
ETHANOL SERPL-MCNC: <10 MG/DL
FENTANYL UR QL SCN: POSITIVE
GFR SERPLBLD CREATININE-BSD FMLA CKD-EPI: 39 ML/MIN/1.73/M2
GLOBULIN SER-MCNC: 3.9 GM/DL (ref 2.4–3.5)
GLUCOSE SERPL-MCNC: 107 MG/DL (ref 82–115)
GLUCOSE UR QL STRIP: NORMAL
GROUP & RH: NORMAL
HCT VFR BLD AUTO: 37 % (ref 37–47)
HGB BLD-MCNC: 12.1 G/DL (ref 12–16)
HGB UR QL STRIP: ABNORMAL
IMM GRANULOCYTES # BLD AUTO: 0.27 X10(3)/MCL (ref 0–0.04)
IMM GRANULOCYTES NFR BLD AUTO: 1.9 %
INDIRECT COOMBS: NORMAL
INR PPP: 1
KETONES UR QL STRIP: NEGATIVE
LACTATE SERPL-SCNC: 1.9 MMOL/L (ref 0.5–2.2)
LEUKOCYTE ESTERASE UR QL STRIP: 500
LYMPHOCYTES # BLD AUTO: 1.58 X10(3)/MCL (ref 0.6–4.6)
LYMPHOCYTES NFR BLD AUTO: 11.1 %
MCH RBC QN AUTO: 28.9 PG (ref 27–31)
MCHC RBC AUTO-ENTMCNC: 32.7 G/DL (ref 33–36)
MCV RBC AUTO: 88.3 FL (ref 80–94)
MDMA UR QL SCN: NEGATIVE
MONOCYTES # BLD AUTO: 0.55 X10(3)/MCL (ref 0.1–1.3)
MONOCYTES NFR BLD AUTO: 3.8 %
MUCOUS THREADS URNS QL MICRO: ABNORMAL /LPF
NEUTROPHILS # BLD AUTO: 11.77 X10(3)/MCL (ref 2.1–9.2)
NEUTROPHILS NFR BLD AUTO: 82.3 %
NITRITE UR QL STRIP: ABNORMAL
NRBC BLD AUTO-RTO: 0 %
OPIATES UR QL SCN: POSITIVE
PCP UR QL: NEGATIVE
PH UR STRIP: 5.5 [PH]
PH UR: 5.5 [PH] (ref 3–11)
PLATELET # BLD AUTO: 324 X10(3)/MCL (ref 130–400)
PLATELETS.RETICULATED NFR BLD AUTO: 1.4 % (ref 0.9–11.2)
PMV BLD AUTO: 9.2 FL (ref 7.4–10.4)
POTASSIUM SERPL-SCNC: 4.6 MMOL/L (ref 3.5–5.1)
PROT SERPL-MCNC: 7.6 GM/DL (ref 5.8–7.6)
PROT UR QL STRIP: ABNORMAL
PROTHROMBIN TIME: 13.2 SECONDS (ref 12.5–14.5)
RBC # BLD AUTO: 4.19 X10(6)/MCL (ref 4.2–5.4)
RBC #/AREA URNS AUTO: ABNORMAL /HPF
SODIUM SERPL-SCNC: 141 MMOL/L (ref 136–145)
SP GR UR STRIP.AUTO: 1.04 (ref 1–1.03)
SPECIFIC GRAVITY, URINE AUTO (.000) (OHS): 1.04 (ref 1–1.03)
SPECIMEN OUTDATE: NORMAL
SQUAMOUS #/AREA URNS LPF: ABNORMAL /HPF
UROBILINOGEN UR STRIP-ACNC: NORMAL
WBC # BLD AUTO: 14.29 X10(3)/MCL (ref 4.5–11.5)
WBC #/AREA URNS AUTO: ABNORMAL /HPF

## 2024-08-14 PROCEDURE — 63600175 PHARM REV CODE 636 W HCPCS: Performed by: EMERGENCY MEDICINE

## 2024-08-14 PROCEDURE — 83605 ASSAY OF LACTIC ACID: CPT | Performed by: EMERGENCY MEDICINE

## 2024-08-14 PROCEDURE — 87077 CULTURE AEROBIC IDENTIFY: CPT | Performed by: EMERGENCY MEDICINE

## 2024-08-14 PROCEDURE — 25000003 PHARM REV CODE 250: Performed by: SURGERY

## 2024-08-14 PROCEDURE — 80307 DRUG TEST PRSMV CHEM ANLYZR: CPT | Performed by: EMERGENCY MEDICINE

## 2024-08-14 PROCEDURE — 96375 TX/PRO/DX INJ NEW DRUG ADDON: CPT

## 2024-08-14 PROCEDURE — 99223 1ST HOSP IP/OBS HIGH 75: CPT | Mod: GC,,, | Performed by: SURGERY

## 2024-08-14 PROCEDURE — 20000000 HC ICU ROOM

## 2024-08-14 PROCEDURE — 81001 URINALYSIS AUTO W/SCOPE: CPT | Performed by: EMERGENCY MEDICINE

## 2024-08-14 PROCEDURE — G0390 TRAUMA RESPONS W/HOSP CRITI: HCPCS

## 2024-08-14 PROCEDURE — 85730 THROMBOPLASTIN TIME PARTIAL: CPT | Performed by: EMERGENCY MEDICINE

## 2024-08-14 PROCEDURE — 96374 THER/PROPH/DIAG INJ IV PUSH: CPT

## 2024-08-14 PROCEDURE — 25500020 PHARM REV CODE 255: Performed by: EMERGENCY MEDICINE

## 2024-08-14 PROCEDURE — 99222 1ST HOSP IP/OBS MODERATE 55: CPT | Mod: AI,FS,GC, | Performed by: SURGERY

## 2024-08-14 PROCEDURE — 63600175 PHARM REV CODE 636 W HCPCS: Performed by: NURSE PRACTITIONER

## 2024-08-14 PROCEDURE — 86901 BLOOD TYPING SEROLOGIC RH(D): CPT | Performed by: EMERGENCY MEDICINE

## 2024-08-14 PROCEDURE — 25000003 PHARM REV CODE 250: Performed by: EMERGENCY MEDICINE

## 2024-08-14 PROCEDURE — 96365 THER/PROPH/DIAG IV INF INIT: CPT

## 2024-08-14 PROCEDURE — 63600175 PHARM REV CODE 636 W HCPCS

## 2024-08-14 PROCEDURE — 86900 BLOOD TYPING SEROLOGIC ABO: CPT | Performed by: EMERGENCY MEDICINE

## 2024-08-14 PROCEDURE — 82077 ASSAY SPEC XCP UR&BREATH IA: CPT | Performed by: EMERGENCY MEDICINE

## 2024-08-14 PROCEDURE — 82550 ASSAY OF CK (CPK): CPT | Performed by: NURSE PRACTITIONER

## 2024-08-14 PROCEDURE — 25500020 PHARM REV CODE 255: Performed by: SURGERY

## 2024-08-14 PROCEDURE — C9248 INJ, CLEVIDIPINE BUTYRATE: HCPCS | Performed by: EMERGENCY MEDICINE

## 2024-08-14 PROCEDURE — 85610 PROTHROMBIN TIME: CPT | Performed by: EMERGENCY MEDICINE

## 2024-08-14 PROCEDURE — 85025 COMPLETE CBC W/AUTO DIFF WBC: CPT | Performed by: EMERGENCY MEDICINE

## 2024-08-14 PROCEDURE — 80053 COMPREHEN METABOLIC PANEL: CPT | Performed by: EMERGENCY MEDICINE

## 2024-08-14 PROCEDURE — 86850 RBC ANTIBODY SCREEN: CPT | Performed by: EMERGENCY MEDICINE

## 2024-08-14 PROCEDURE — 25000003 PHARM REV CODE 250: Performed by: NURSE PRACTITIONER

## 2024-08-14 PROCEDURE — 99285 EMERGENCY DEPT VISIT HI MDM: CPT | Mod: 25

## 2024-08-14 PROCEDURE — 87086 URINE CULTURE/COLONY COUNT: CPT | Performed by: EMERGENCY MEDICINE

## 2024-08-14 RX ORDER — MUPIROCIN 20 MG/G
OINTMENT TOPICAL 2 TIMES DAILY
Status: DISCONTINUED | OUTPATIENT
Start: 2024-08-14 | End: 2024-08-19 | Stop reason: HOSPADM

## 2024-08-14 RX ORDER — FAMOTIDINE 20 MG/1
20 TABLET, FILM COATED ORAL 2 TIMES DAILY
Status: DISCONTINUED | OUTPATIENT
Start: 2024-08-14 | End: 2024-08-15

## 2024-08-14 RX ORDER — LEVETIRACETAM 500 MG/1
500 TABLET ORAL 2 TIMES DAILY
Status: DISCONTINUED | OUTPATIENT
Start: 2024-08-14 | End: 2024-08-19 | Stop reason: HOSPADM

## 2024-08-14 RX ORDER — NICARDIPINE HYDROCHLORIDE 0.2 MG/ML
INJECTION INTRAVENOUS
Status: COMPLETED
Start: 2024-08-14 | End: 2024-08-14

## 2024-08-14 RX ORDER — SODIUM CHLORIDE 9 MG/ML
INJECTION, SOLUTION INTRAVENOUS CONTINUOUS
Status: DISCONTINUED | OUTPATIENT
Start: 2024-08-14 | End: 2024-08-15

## 2024-08-14 RX ORDER — OXYCODONE HYDROCHLORIDE 5 MG/1
5 TABLET ORAL EVERY 4 HOURS PRN
Status: DISCONTINUED | OUTPATIENT
Start: 2024-08-14 | End: 2024-08-19 | Stop reason: HOSPADM

## 2024-08-14 RX ORDER — DOCUSATE SODIUM 100 MG/1
100 CAPSULE, LIQUID FILLED ORAL 2 TIMES DAILY
Status: DISCONTINUED | OUTPATIENT
Start: 2024-08-14 | End: 2024-08-19 | Stop reason: HOSPADM

## 2024-08-14 RX ORDER — ACETAMINOPHEN 325 MG/1
650 TABLET ORAL EVERY 4 HOURS
Status: DISCONTINUED | OUTPATIENT
Start: 2024-08-14 | End: 2024-08-19 | Stop reason: HOSPADM

## 2024-08-14 RX ORDER — BISACODYL 10 MG/1
10 SUPPOSITORY RECTAL DAILY PRN
Status: DISCONTINUED | OUTPATIENT
Start: 2024-08-14 | End: 2024-08-19 | Stop reason: HOSPADM

## 2024-08-14 RX ORDER — NICARDIPINE HYDROCHLORIDE 0.2 MG/ML
0-15 INJECTION INTRAVENOUS CONTINUOUS
Status: DISCONTINUED | OUTPATIENT
Start: 2024-08-14 | End: 2024-08-15

## 2024-08-14 RX ORDER — ONDANSETRON HYDROCHLORIDE 2 MG/ML
INJECTION, SOLUTION INTRAVENOUS
Status: DISPENSED
Start: 2024-08-14 | End: 2024-08-15

## 2024-08-14 RX ORDER — POLYETHYLENE GLYCOL 3350 17 G/17G
17 POWDER, FOR SOLUTION ORAL 2 TIMES DAILY
Status: DISCONTINUED | OUTPATIENT
Start: 2024-08-14 | End: 2024-08-19 | Stop reason: HOSPADM

## 2024-08-14 RX ORDER — MORPHINE SULFATE 4 MG/ML
2 INJECTION, SOLUTION INTRAMUSCULAR; INTRAVENOUS
Status: DISCONTINUED | OUTPATIENT
Start: 2024-08-14 | End: 2024-08-16

## 2024-08-14 RX ORDER — TALC
6 POWDER (GRAM) TOPICAL NIGHTLY PRN
Status: DISCONTINUED | OUTPATIENT
Start: 2024-08-14 | End: 2024-08-19 | Stop reason: HOSPADM

## 2024-08-14 RX ORDER — ONDANSETRON HYDROCHLORIDE 2 MG/ML
INJECTION, SOLUTION INTRAVENOUS CODE/TRAUMA/SEDATION MEDICATION
Status: COMPLETED | OUTPATIENT
Start: 2024-08-14 | End: 2024-08-14

## 2024-08-14 RX ORDER — HYDRALAZINE HYDROCHLORIDE 20 MG/ML
10 INJECTION INTRAMUSCULAR; INTRAVENOUS EVERY 6 HOURS PRN
Status: DISCONTINUED | OUTPATIENT
Start: 2024-08-14 | End: 2024-08-19 | Stop reason: HOSPADM

## 2024-08-14 RX ORDER — METHOCARBAMOL 500 MG/1
500 TABLET, FILM COATED ORAL EVERY 8 HOURS
Status: DISCONTINUED | OUTPATIENT
Start: 2024-08-14 | End: 2024-08-19 | Stop reason: HOSPADM

## 2024-08-14 RX ORDER — SODIUM CHLORIDE 9 MG/ML
INJECTION, SOLUTION INTRAVENOUS
Status: COMPLETED | OUTPATIENT
Start: 2024-08-14 | End: 2024-08-14

## 2024-08-14 RX ORDER — FENTANYL CITRATE 50 UG/ML
INJECTION, SOLUTION INTRAMUSCULAR; INTRAVENOUS CODE/TRAUMA/SEDATION MEDICATION
Status: COMPLETED | OUTPATIENT
Start: 2024-08-14 | End: 2024-08-14

## 2024-08-14 RX ORDER — FENTANYL CITRATE 50 UG/ML
INJECTION, SOLUTION INTRAMUSCULAR; INTRAVENOUS
Status: DISPENSED
Start: 2024-08-14 | End: 2024-08-15

## 2024-08-14 RX ADMIN — SODIUM CHLORIDE 1000 ML: 9 INJECTION, SOLUTION INTRAVENOUS at 07:08

## 2024-08-14 RX ADMIN — SODIUM CHLORIDE: 9 INJECTION, SOLUTION INTRAVENOUS at 08:08

## 2024-08-14 RX ADMIN — NICARDIPINE HYDROCHLORIDE 5 MG/HR: 0.2 INJECTION INTRAVENOUS at 08:08

## 2024-08-14 RX ADMIN — ONDANSETRON 4 MG: 2 INJECTION INTRAMUSCULAR; INTRAVENOUS at 07:08

## 2024-08-14 RX ADMIN — MORPHINE SULFATE 2 MG: 4 INJECTION, SOLUTION INTRAMUSCULAR; INTRAVENOUS at 10:08

## 2024-08-14 RX ADMIN — NICARDIPINE HYDROCHLORIDE 5 MG/HR: 0.2 INJECTION, SOLUTION INTRAVENOUS at 08:08

## 2024-08-14 RX ADMIN — IOHEXOL 100 ML: 350 INJECTION, SOLUTION INTRAVENOUS at 09:08

## 2024-08-14 RX ADMIN — CLEVIPIDINE 1 MG/HR: 0.5 EMULSION INTRAVENOUS at 08:08

## 2024-08-14 RX ADMIN — FENTANYL CITRATE 50 MCG: 50 INJECTION, SOLUTION INTRAMUSCULAR; INTRAVENOUS at 07:08

## 2024-08-14 RX ADMIN — IOHEXOL 100 ML: 350 INJECTION, SOLUTION INTRAVENOUS at 07:08

## 2024-08-14 RX ADMIN — METHOCARBAMOL 500 MG: 500 TABLET ORAL at 09:08

## 2024-08-14 RX ADMIN — FAMOTIDINE 20 MG: 20 TABLET, FILM COATED ORAL at 08:08

## 2024-08-14 RX ADMIN — POLYETHYLENE GLYCOL 3350 17 G: 17 POWDER, FOR SOLUTION ORAL at 08:08

## 2024-08-14 RX ADMIN — MUPIROCIN: 20 OINTMENT TOPICAL at 11:08

## 2024-08-14 RX ADMIN — LEVETIRACETAM 500 MG: 500 TABLET, FILM COATED ORAL at 08:08

## 2024-08-14 RX ADMIN — ACETAMINOPHEN 325MG 650 MG: 325 TABLET ORAL at 09:08

## 2024-08-14 RX ADMIN — DOCUSATE SODIUM 100 MG: 100 CAPSULE, LIQUID FILLED ORAL at 08:08

## 2024-08-15 LAB
ALBUMIN SERPL-MCNC: 3.1 G/DL (ref 3.4–4.8)
ALBUMIN/GLOB SERPL: 1 RATIO (ref 1.1–2)
ALP SERPL-CCNC: 78 UNIT/L (ref 40–150)
ALT SERPL-CCNC: 26 UNIT/L (ref 0–55)
ANION GAP SERPL CALC-SCNC: 10 MEQ/L
AST SERPL-CCNC: 34 UNIT/L (ref 5–34)
BASOPHILS # BLD AUTO: 0.02 X10(3)/MCL
BASOPHILS NFR BLD AUTO: 0.2 %
BILIRUB SERPL-MCNC: 0.4 MG/DL
BUN SERPL-MCNC: 21 MG/DL (ref 9.8–20.1)
CALCIUM SERPL-MCNC: 8.5 MG/DL (ref 8.4–10.2)
CHLORIDE SERPL-SCNC: 109 MMOL/L (ref 98–107)
CO2 SERPL-SCNC: 20 MMOL/L (ref 23–31)
CREAT SERPL-MCNC: 1 MG/DL (ref 0.55–1.02)
CREAT/UREA NIT SERPL: 21
CRP SERPL-MCNC: 18.7 MG/L
EOSINOPHIL # BLD AUTO: 0.01 X10(3)/MCL (ref 0–0.9)
EOSINOPHIL NFR BLD AUTO: 0.1 %
ERYTHROCYTE [DISTWIDTH] IN BLOOD BY AUTOMATED COUNT: 13.4 % (ref 11.5–17)
GFR SERPLBLD CREATININE-BSD FMLA CKD-EPI: 56 ML/MIN/1.73/M2
GLOBULIN SER-MCNC: 3.2 GM/DL (ref 2.4–3.5)
GLUCOSE SERPL-MCNC: 131 MG/DL (ref 82–115)
HCT VFR BLD AUTO: 33.6 % (ref 37–47)
HGB BLD-MCNC: 10.8 G/DL (ref 12–16)
IMM GRANULOCYTES # BLD AUTO: 0.07 X10(3)/MCL (ref 0–0.04)
IMM GRANULOCYTES NFR BLD AUTO: 0.8 %
LYMPHOCYTES # BLD AUTO: 1.28 X10(3)/MCL (ref 0.6–4.6)
LYMPHOCYTES NFR BLD AUTO: 14.7 %
MCH RBC QN AUTO: 28.7 PG (ref 27–31)
MCHC RBC AUTO-ENTMCNC: 32.1 G/DL (ref 33–36)
MCV RBC AUTO: 89.4 FL (ref 80–94)
MONOCYTES # BLD AUTO: 0.52 X10(3)/MCL (ref 0.1–1.3)
MONOCYTES NFR BLD AUTO: 6 %
NEUTROPHILS # BLD AUTO: 6.78 X10(3)/MCL (ref 2.1–9.2)
NEUTROPHILS NFR BLD AUTO: 78.2 %
NRBC BLD AUTO-RTO: 0 %
PLATELET # BLD AUTO: 278 X10(3)/MCL (ref 130–400)
PLATELETS.RETICULATED NFR BLD AUTO: 1.5 % (ref 0.9–11.2)
PMV BLD AUTO: 9.5 FL (ref 7.4–10.4)
POCT GLUCOSE: 117 MG/DL (ref 70–110)
POTASSIUM SERPL-SCNC: 4.9 MMOL/L (ref 3.5–5.1)
PREALB SERPL-MCNC: 24 MG/DL (ref 14–37)
PROT SERPL-MCNC: 6.3 GM/DL (ref 5.8–7.6)
RBC # BLD AUTO: 3.76 X10(6)/MCL (ref 4.2–5.4)
SODIUM SERPL-SCNC: 139 MMOL/L (ref 136–145)
WBC # BLD AUTO: 8.68 X10(3)/MCL (ref 4.5–11.5)

## 2024-08-15 PROCEDURE — 99900035 HC TECH TIME PER 15 MIN (STAT)

## 2024-08-15 PROCEDURE — 27000646 HC AEROBIKA DEVICE

## 2024-08-15 PROCEDURE — 25000003 PHARM REV CODE 250: Performed by: NURSE PRACTITIONER

## 2024-08-15 PROCEDURE — 25000242 PHARM REV CODE 250 ALT 637 W/ HCPCS: Performed by: SURGERY

## 2024-08-15 PROCEDURE — 99900031 HC PATIENT EDUCATION (STAT)

## 2024-08-15 PROCEDURE — 94760 N-INVAS EAR/PLS OXIMETRY 1: CPT

## 2024-08-15 PROCEDURE — 94664 DEMO&/EVAL PT USE INHALER: CPT

## 2024-08-15 PROCEDURE — 63600175 PHARM REV CODE 636 W HCPCS: Performed by: NURSE PRACTITIONER

## 2024-08-15 PROCEDURE — 97163 PT EVAL HIGH COMPLEX 45 MIN: CPT

## 2024-08-15 PROCEDURE — 99223 1ST HOSP IP/OBS HIGH 75: CPT | Mod: ,,, | Performed by: NURSE PRACTITIONER

## 2024-08-15 PROCEDURE — 85025 COMPLETE CBC W/AUTO DIFF WBC: CPT | Performed by: NURSE PRACTITIONER

## 2024-08-15 PROCEDURE — 97166 OT EVAL MOD COMPLEX 45 MIN: CPT

## 2024-08-15 PROCEDURE — 36415 COLL VENOUS BLD VENIPUNCTURE: CPT | Performed by: NURSE PRACTITIONER

## 2024-08-15 PROCEDURE — 99291 CRITICAL CARE FIRST HOUR: CPT | Mod: ,,, | Performed by: SURGERY

## 2024-08-15 PROCEDURE — 20000000 HC ICU ROOM

## 2024-08-15 PROCEDURE — 86140 C-REACTIVE PROTEIN: CPT | Performed by: NURSE PRACTITIONER

## 2024-08-15 PROCEDURE — 94761 N-INVAS EAR/PLS OXIMETRY MLT: CPT

## 2024-08-15 PROCEDURE — 84134 ASSAY OF PREALBUMIN: CPT | Performed by: NURSE PRACTITIONER

## 2024-08-15 PROCEDURE — 27000221 HC OXYGEN, UP TO 24 HOURS

## 2024-08-15 PROCEDURE — 25000003 PHARM REV CODE 250: Performed by: SURGERY

## 2024-08-15 PROCEDURE — 80053 COMPREHEN METABOLIC PANEL: CPT | Performed by: NURSE PRACTITIONER

## 2024-08-15 PROCEDURE — 99223 1ST HOSP IP/OBS HIGH 75: CPT | Mod: ,,, | Performed by: ORTHOPAEDIC SURGERY

## 2024-08-15 PROCEDURE — 94640 AIRWAY INHALATION TREATMENT: CPT

## 2024-08-15 RX ORDER — INSULIN ASPART 100 [IU]/ML
0-10 INJECTION, SOLUTION INTRAVENOUS; SUBCUTANEOUS
Status: DISCONTINUED | OUTPATIENT
Start: 2024-08-15 | End: 2024-08-19 | Stop reason: HOSPADM

## 2024-08-15 RX ORDER — IBUPROFEN 200 MG
16 TABLET ORAL
Status: DISCONTINUED | OUTPATIENT
Start: 2024-08-15 | End: 2024-08-19 | Stop reason: HOSPADM

## 2024-08-15 RX ORDER — IPRATROPIUM BROMIDE AND ALBUTEROL SULFATE 2.5; .5 MG/3ML; MG/3ML
3 SOLUTION RESPIRATORY (INHALATION) EVERY 4 HOURS
Status: DISCONTINUED | OUTPATIENT
Start: 2024-08-15 | End: 2024-08-19 | Stop reason: HOSPADM

## 2024-08-15 RX ORDER — IBUPROFEN 200 MG
24 TABLET ORAL
Status: DISCONTINUED | OUTPATIENT
Start: 2024-08-15 | End: 2024-08-19 | Stop reason: HOSPADM

## 2024-08-15 RX ORDER — GLUCAGON 1 MG
1 KIT INJECTION
Status: DISCONTINUED | OUTPATIENT
Start: 2024-08-15 | End: 2024-08-19 | Stop reason: HOSPADM

## 2024-08-15 RX ORDER — FAMOTIDINE 20 MG/1
20 TABLET, FILM COATED ORAL DAILY
Status: DISCONTINUED | OUTPATIENT
Start: 2024-08-15 | End: 2024-08-15

## 2024-08-15 RX ORDER — LIDOCAINE 50 MG/G
2 PATCH TOPICAL
Status: DISCONTINUED | OUTPATIENT
Start: 2024-08-15 | End: 2024-08-19 | Stop reason: HOSPADM

## 2024-08-15 RX ADMIN — IPRATROPIUM BROMIDE AND ALBUTEROL SULFATE 3 ML: .5; 3 SOLUTION RESPIRATORY (INHALATION) at 08:08

## 2024-08-15 RX ADMIN — IPRATROPIUM BROMIDE AND ALBUTEROL SULFATE 3 ML: .5; 3 SOLUTION RESPIRATORY (INHALATION) at 12:08

## 2024-08-15 RX ADMIN — IPRATROPIUM BROMIDE AND ALBUTEROL SULFATE 3 ML: .5; 3 SOLUTION RESPIRATORY (INHALATION) at 04:08

## 2024-08-15 RX ADMIN — ACETAMINOPHEN 325MG 650 MG: 325 TABLET ORAL at 06:08

## 2024-08-15 RX ADMIN — OXYCODONE HYDROCHLORIDE 5 MG: 5 TABLET ORAL at 08:08

## 2024-08-15 RX ADMIN — MUPIROCIN: 20 OINTMENT TOPICAL at 08:08

## 2024-08-15 RX ADMIN — Medication 6 MG: at 07:08

## 2024-08-15 RX ADMIN — MUPIROCIN: 20 OINTMENT TOPICAL at 09:08

## 2024-08-15 RX ADMIN — OXYCODONE HYDROCHLORIDE 5 MG: 5 TABLET ORAL at 12:08

## 2024-08-15 RX ADMIN — METHOCARBAMOL 500 MG: 500 TABLET ORAL at 02:08

## 2024-08-15 RX ADMIN — FAMOTIDINE 20 MG: 20 TABLET, FILM COATED ORAL at 09:08

## 2024-08-15 RX ADMIN — ACETAMINOPHEN 325MG 650 MG: 325 TABLET ORAL at 10:08

## 2024-08-15 RX ADMIN — POLYETHYLENE GLYCOL 3350 17 G: 17 POWDER, FOR SOLUTION ORAL at 09:08

## 2024-08-15 RX ADMIN — OXYCODONE HYDROCHLORIDE 5 MG: 5 TABLET ORAL at 05:08

## 2024-08-15 RX ADMIN — POLYETHYLENE GLYCOL 3350 17 G: 17 POWDER, FOR SOLUTION ORAL at 08:08

## 2024-08-15 RX ADMIN — LEVETIRACETAM 500 MG: 500 TABLET, FILM COATED ORAL at 08:08

## 2024-08-15 RX ADMIN — DOCUSATE SODIUM 100 MG: 100 CAPSULE, LIQUID FILLED ORAL at 09:08

## 2024-08-15 RX ADMIN — LIDOCAINE 2 PATCH: 700 PATCH TOPICAL at 09:08

## 2024-08-15 RX ADMIN — ACETAMINOPHEN 325MG 650 MG: 325 TABLET ORAL at 08:08

## 2024-08-15 RX ADMIN — ACETAMINOPHEN 325MG 650 MG: 325 TABLET ORAL at 02:08

## 2024-08-15 RX ADMIN — OXYCODONE HYDROCHLORIDE 5 MG: 5 TABLET ORAL at 07:08

## 2024-08-15 RX ADMIN — DOCUSATE SODIUM 100 MG: 100 CAPSULE, LIQUID FILLED ORAL at 08:08

## 2024-08-15 RX ADMIN — METHOCARBAMOL 500 MG: 500 TABLET ORAL at 06:08

## 2024-08-15 RX ADMIN — METHOCARBAMOL 500 MG: 500 TABLET ORAL at 10:08

## 2024-08-15 RX ADMIN — LEVETIRACETAM 500 MG: 500 TABLET, FILM COATED ORAL at 09:08

## 2024-08-15 RX ADMIN — MORPHINE SULFATE 2 MG: 4 INJECTION, SOLUTION INTRAMUSCULAR; INTRAVENOUS at 02:08

## 2024-08-15 NOTE — PLAN OF CARE
Fell off porch at home fx  R 1-7ribs ,clavicle ( non op), pneumothorax resolved  SDH and subarachnoid hemmorrhage no surgical intervention     Medicare only per chart    Lives independently    Daughter  Jemima Estes [phone]    Therapy working with pt when  arrived to do assessment at 1417. Observed efforts being used to assist pt to sit on side of bed. Will follow for therapy recommendations.

## 2024-08-15 NOTE — PROGRESS NOTES
TERTIARY TRAUMA SURVEY (TTS)    List Injuries Identified to Date:    SAH  SDH  Multiple right rib fractures  Fracture of right clavicle  Pneumothorax        [x]Problems list reviewed  List Operations and Procedures:   1. none    Past Surgical History:   Procedure Laterality Date    VASCULAR SURGERY         Incidental findings:   1. none    Past Medical History:   DMII  Restless leg syndrome  Diabetic neuropathy  PVD  HTN  HLD  Dementia      Active Ambulatory Problems     Diagnosis Date Noted    No Active Ambulatory Problems     Resolved Ambulatory Problems     Diagnosis Date Noted    No Resolved Ambulatory Problems     Past Medical History:   Diagnosis Date    Diabetes mellitus      Past Medical History:   Diagnosis Date    Diabetes mellitus        Tertiary Physical Exam:     Physical Exam  HENT:      Head:      Comments: Bruised right eye with conjunctiva injection to eye     Nose: Nose normal.      Mouth/Throat:      Mouth: Mucous membranes are moist.   Eyes:      Extraocular Movements: Extraocular movements intact.   Neck:      Comments: C-collar in place  Cardiovascular:      Rate and Rhythm: Normal rate and regular rhythm.   Pulmonary:      Effort: Pulmonary effort is normal.      Comments: Coarse BS  Abdominal:      General: Abdomen is flat. Bowel sounds are normal.      Palpations: Abdomen is soft.   Musculoskeletal:         General: Normal range of motion.   Skin:     General: Skin is warm.      Capillary Refill: Capillary refill takes less than 2 seconds.   Neurological:      General: No focal deficit present.      Mental Status: She is alert and oriented to person, place, and time.      Comments: GCS-15   Psychiatric:         Mood and Affect: Mood normal.         Behavior: Behavior normal.         Imaging Review:     Imaging Results              X-Ray Clavicle Right (Final result)  Result time 08/14/24 22:51:21      Final result by Lyndon Patel MD (08/14/24 22:51:21)                   Impression:       Acute fracture distal clavicle.      Electronically signed by: Lyndon Patel  Date:    08/14/2024  Time:    22:51               Narrative:    EXAMINATION:  XR CLAVICLE RIGHT    CLINICAL HISTORY:  fracture;    TECHNIQUE:  Two-view    FINDINGS:  There is chronic healed fracture of the midportion of the right clavicle.  There is acute fracture of the distal clavicle.  There is no separation of acromioclavicular joint.                                       CTA Neck (Final result)  Result time 08/14/24 21:44:51      Final result by Lyndon Patel MD (08/14/24 21:44:51)                   Impression:      No hemodynamically significant stenosis or arterial injury identified.      Electronically signed by: Lyndon Patel  Date:    08/14/2024  Time:    21:44               Narrative:    EXAMINATION:  CTA NECK    CLINICAL HISTORY:  1st rib fracture;    TECHNIQUE:  Multidetector axial images were performed of the neck before and following administration of contrast CT angiogram images were reconstructed.  Sagittal coronal images were reconstructed.  MIP and MPR images were also reconstructed.    Dose length product was 519 mGycm. Automated radiation control was utilized to minimize radiation dose.    COMPARISON:  CT chest same date    FINDINGS:  Carotid arteries are assessed in accordance with the NASCET criteria.    Visualized portion of the thoracic aorta is remarkable for calcified plaques without aneurysmal dilatation.  Proximal portion of the brachiocephalic trunk and the left common carotid artery are not well seen due to adjacent intense venous contrast.  There is mild calcified plaque of the brachiocephalic trunk without significant stenosis.  Visualized portion of subclavian arteries are patent.    There is unremarkable contrast flow within the right common carotid artery, internal and external carotid arteries without flow-limiting stenosis, spasm, dissection, aneurysm prominence or intraluminal thrombus.  The  proximal portion of the left common carotid artery is not well seen due to artifacts.  Otherwise, the left common carotid artery, internal and the external carotid arteries are without hemodynamically significant stenosis, dissection, intraluminal thrombus or aneurysmal prominence.    Flow is seen bilaterally within vertebral arteries without dissection or aneurysm.    Chest findings as described previously.                                       CT 3D Rendering WO Independent Workstation (Final result)  Result time 08/15/24 08:38:38      Final result by Misael Ledesma MD (08/15/24 08:38:38)                   Impression:      3D osseous reconstructions.      Electronically signed by: Misael Ledesma  Date:    08/15/2024  Time:    08:38               Narrative:    EXAMINATION:  CT 3D RENDERING WO INDEPENDENT WORKSTATION    CLINICAL HISTORY:  rib recon;    TECHNIQUE:  3D reconstructions of the rib cage performed from data set for chest CT performed earlier on 08/14/2024.    COMPARISON:  Chest CT 08/14/2024    FINDINGS:  3D reconstructions of the ribcage for further assessment of underlying fractures and surgical planning.                                       CT Chest Abdomen Pelvis With IV Contrast (XPD) Routine (Final result)  Result time 08/14/24 20:24:38      Final result by Misael Ledesma MD (08/14/24 20:24:38)                   Impression:      Multiple right rib fractures with very small right pneumothorax.    Fractures of the proximal and distal portions of the right clavicle.      Electronically signed by: Misael Ledesma  Date:    08/14/2024  Time:    20:24               Narrative:    EXAMINATION:  CT CHEST ABDOMEN PELVIS WITH IV CONTRAST (XPD)    CLINICAL HISTORY:  Trauma;    TECHNIQUE:  CT imaging of the chest, abdomen and pelvis after IV contrast. Axial, coronal and sagittal reformatted images reviewed. Dose length product is 1333 mGycm. Automatic exposure control, adjustment of mA/kV or iterative  reconstruction technique used to limit radiation dose.    COMPARISON:  No relevant comparison studies available at the time of dictation.    FINDINGS:  No mediastinal hematoma or significant pericardial fluid.  Very small right pneumothorax.  Mild atelectasis bilaterally.    No defined hepatic or splenic laceration.  Previous cholecystectomy.  Normal pancreas and adrenal glands.  Areas of right renal cortical scarring.  No traumatic renal findings.  Normal bladder.  Small hiatal hernia.  No mesenteric hematoma, pneumoperitoneum or ascites.  Left iliac venous stent.    Fractures of the posterior right 1st through 7th ribs.  The right 4th, 5th and 6th rib fractures are displaced and overlapped.  Minimally displaced fractures of the lateral right 4th through 7th ribs.  Minimally displaced fracture at the right 1st costochondral junction.  Minimally displaced medial right clavicle fracture.  Displaced fracture of the distal right clavicle.                                       CT Cervical Spine Without Contrast (Final result)  Result time 08/14/24 20:01:58      Final result by Lyndon Patel MD (08/14/24 20:01:58)                   Impression:      No acute fracture or malalignment identified.      Electronically signed by: Lyndon Patel  Date:    08/14/2024  Time:    20:01               Narrative:    EXAMINATION:  CT CERVICAL SPINE WITHOUT CONTRAST    CLINICAL HISTORY:  Trauma.    TECHNIQUE:  Multidetector axial images were performed of the cervical spine without and.  Images were reconstructed.    Automated exposure control was utilized to minimize radiation dose.  DLP 1528.    COMPARISON:  None available.    FINDINGS:  There is solid ACDF at C4, C5, C6 and C7.  There are interbody fusions at C4-C5, C5-C6 and C6-C7.  Cervical vertebrae stature is maintained and alignment is unremarkable.  No acute fracture or malalignment identified.  There are mild degenerative changes which cause some impression ventral thecal sac  without significant narrowings of the neural foramen..  There is no prevertebral soft tissue prominence.    This study does not exclude the possibility of intrathecal soft tissue, ligamentous or vascular injury.                                       CT Maxillofacial Without Contrast (Final result)  Result time 08/14/24 20:01:46      Final result by Lyndon Patel MD (08/14/24 20:01:46)                   Impression:      1. Right periorbital soft tissue inflammations    2. No acute maxillofacial fracture identified      Electronically signed by: Lyndon Paetl  Date:    08/14/2024  Time:    20:01               Narrative:    EXAMINATION:  CT MAXILLOFACIAL WITHOUT CONTRAST    CLINICAL HISTORY:  Facial trauma, blunt;    TECHNIQUE:  Multidetector axial images were performed maxillofacial without contrast and images reformatted.    Dose length product of 1528 mGycm. Automated exposure control was utilized to minimize radiation dose.    COMPARISON:  None available.    FINDINGS:  There are right periorbital soft tissue inflammations.  There are no fractures of the orbital walls. The globes are unremarkable and no intra-orbital inflammations or emphysema identified. There are no fractures of the nasal bones, pterygoids, zygomatic arches, paranasal sinuses walls or the mandibles.                                       CT Head Without Contrast (Final result)  Result time 08/14/24 19:57:45      Final result by Lyndon Patel MD (08/14/24 19:57:45)                   Impression:      Left shallow subdural hematoma and small subarachnoid hemorrhage.      Electronically signed by: Lyndon Patel  Date:    08/14/2024  Time:    19:57               Narrative:    EXAMINATION:  CT HEAD WITHOUT CONTRAST    CLINICAL HISTORY:  Trauma;    TECHNIQUE:  Sequential axial images were performed of the brain without contrast.    Dose product length of 1528 mGycm. Automated exposure control was utilized to minimize radiation  dose.    COMPARISON:  None available.    FINDINGS:  There is left cerebral convexity shallow subdural hematoma with maximum thickness of 3.5 mm.  There is also adjacent left frontal small subarachnoid hemorrhage on image 29 series 3.  There is no mass effect, midline shift or hydrocephalus. There is no sulcal effacement or low attenuation changes to suggest recent large vessel territory infarction. Chronic appearing periventricular and subcortical white matter low attenuation changes are present and are consistent with chronic microangiopathic ischemia. The ventricular system and sulcal markings prominence is consistent with atrophy.  No acute depressed skull fracture identified.    Maxillofacial findings are described on separate report.                                       X-Ray Pelvis Routine AP (Final result)  Result time 08/14/24 20:38:01      Final result by Lyndon Patel MD (08/14/24 20:38:01)                   Impression:      No acute osseous abnormality identified.      Electronically signed by: Lyndon Patel  Date:    08/14/2024  Time:    20:38               Narrative:    EXAMINATION:  Pelvis XR PELVIS ROUTINE AP    CLINICAL HISTORY:  Trauma.    TECHNIQUE:  One view    COMPARISON:  None available.    FINDINGS:  Bilateral iliac crests were excluded on the image.  There is left iliac stent graft.  Demineralization of the bones.  As visualized, no acute fracture or dislocation identified                                       X-Ray Chest 1 View (Final result)  Result time 08/14/24 22:31:06      Final result by Lyndon Patel MD (08/14/24 22:31:06)                   Impression:      As above.  Please see CT chest report for details.      Electronically signed by: Lyndon Patel  Date:    08/14/2024  Time:    22:31               Narrative:    EXAMINATION:  XR CHEST 1 VIEW    CLINICAL HISTORY:  r/o bleeding or hemorrhage;    TECHNIQUE:  Two views    COMPARISON:  CT chest same  "date..    FINDINGS:  Cardiopericardial silhouette is mildly enlarged.  There are multiple fractures of the right ribs.  Right lung patchy opacities suggest contusions.  CT chest was also remarkable for small right pneumothorax which is not readily apparent on the chest radiograph.  Old appearing deformity of the right clavicle.                                       Lab Review:   CBC:  Recent Labs   Lab Result Units 08/14/24  1914 08/15/24  0440   WBC x10(3)/mcL 14.29* 8.68   RBC x10(6)/mcL 4.19* 3.76*   Hgb g/dL 12.1 10.8*   Hct % 37.0 33.6*   Platelet x10(3)/mcL 324 278   MCV fL 88.3 89.4   MCH pg 28.9 28.7   MCHC g/dL 32.7* 32.1*       CMP:  Recent Labs   Lab Result Units 08/14/24  1914 08/15/24  0440   Calcium mg/dL 9.9 8.5   Albumin g/dL 3.7 3.1*   Sodium mmol/L 141 139   Potassium mmol/L 4.6 4.9   CO2 mmol/L 21* 20*   Chloride mmol/L 108* 109*   Blood Urea Nitrogen mg/dL 30.0* 21.0*   Creatinine mg/dL 1.36* 1.00   ALP unit/L 85 78   ALT unit/L 18 26   AST unit/L 26 34   Bilirubin Total mg/dL 0.3 0.4       Troponin:  No results for input(s): "TROPONINI" in the last 2160 hours.    ETOH:  Recent Labs     08/14/24 1914   ETHANOL <10.0        Urine Drug Screen:  Recent Labs     08/14/24 2041   FENTANYL Positive*   MDMA Negative        Plan:   SDH/SAH- Keppra x7 days BP<140, PT/OT   Right rib fractures- Pain control/Nebs/IS/Flutter valve  PTX-resolved on this Ams CXR  HTN- off cardine will restart home meds  Right clavicle fx- non-op WBAT    Mars Mora Jr, MD MS  Trauma Critical Care Surgery     40 minutes of critical care was spent on this patient personally by me on the following activities: development of treatment plan with patient and bedside nurse, discussions with consultants, evaluation of patient's response to treatment, examining the patient, ordering and preforming treatments and interventions, ordering and reviewing laboratory studies, ordering and reviewing radiologic studies, and re-evaluation " of patient's condition.

## 2024-08-15 NOTE — CONSULTS
[facility] - [facility]  Orthopedics  Consult Note    Patient Name: Cata Gasca  MRN: [id number]  Admission Date: 8/14/2024  Hospital Length of Stay: 1 days  Attending Provider: Mars Mora Jr., *  Primary Care Provider: Itzel, Primary Doctor      Subjective:     Principal Orthopedic Problem:R clavicle fracture    HPI: 85 Yo female here following a fall from her porch. PMH include DM, PVD, HTN, HLD. Was found to have SAH and SDH, right sided rib fractures, and a right clavicel fracture. She is alert and oriented on assessment. Has broken her right clavicle 2 prior times, both treated non-operatively. She is NVI to her RUE, does have pain with palpation over this right clavicle    Past Medical History:   Diagnosis Date    Diabetes mellitus        Past Surgical History:   Procedure Laterality Date    VASCULAR SURGERY         Review of patient's allergies indicates:  No Known Allergies    Current Facility-Administered Medications   Medication    acetaminophen tablet 650 mg    albuterol-ipratropium 2.5 mg-0.5 mg/3 mL nebulizer solution 3 mL    bisacodyL suppository 10 mg    dextrose 10% bolus 125 mL 125 mL    dextrose 10% bolus 250 mL 250 mL    docusate sodium capsule 100 mg    famotidine tablet 20 mg    glucagon (human recombinant) injection 1 mg    glucose chewable tablet 16 g    glucose chewable tablet 24 g    hydrALAZINE injection 10 mg    insulin aspart U-100 injection 0-10 Units    levETIRAcetam tablet 500 mg    LIDOcaine 5 % patch 2 patch    melatonin tablet 6 mg    methocarbamoL tablet 500 mg    morphine injection 2 mg    mupirocin 2 % ointment    oxyCODONE immediate release tablet 5 mg    polyethylene glycol packet 17 g     Family History    None       Tobacco Use    Smoking status: Not on file    Smokeless tobacco: Not on file   Substance and Sexual Activity    Alcohol use: Not on file    Drug use: Not on file    Sexual activity: Not on file     ROS  Objective:     Vital Signs (Most  "Recent):  Temp: 98.5 °F (36.9 °C) (08/15/24 0410)  Pulse: 72 (08/15/24 1224)  Resp: 18 (08/15/24 1224)  BP: (!) 139/56 (08/15/24 0900)  SpO2: 96 % (08/15/24 1224) Vital Signs (24h Range):  Temp:  [98 °F (36.7 °C)-98.6 °F (37 °C)] 98.5 °F (36.9 °C)  Pulse:  [] 72  Resp:  [12-25] 18  SpO2:  [92 %-100 %] 96 %  BP: ()/(41-91) 139/56     Weight: 90.7 kg (199 lb 15.3 oz)  Height: 5' 5" (165.1 cm)  Body mass index is 33.27 kg/m².      Intake/Output Summary (Last 24 hours) at 8/15/2024 1309  Last data filed at 8/15/2024 0600  Gross per 24 hour   Intake 2959.02 ml   Output 400 ml   Net 2559.02 ml       Ortho/SPM Exam  General the patient is alert and oriented x3 no acute distress nontoxic-appearing appropriate affect.Ecchymosis over her right eye    Constitutional: Vital signs are examined and stable.  Resp: No signs of labored breathing    RUE: -Skin: No signs of new abrasions or lacerations over clavicle           -MSK: +AIN/PIN/Median/Radial/Ulnar motor, TTP over clavicle, no tenting           -Neuro:  Sensation intact to light touch           -CV:  Capillary refill is less than 2 seconds. + RP Compartments soft and compressible                 Significant Labs: CBC:   Recent Labs   Lab 08/14/24  1914 08/15/24  0440   WBC 14.29* 8.68   HGB 12.1 10.8*   HCT 37.0 33.6*    278     All pertinent labs within the past 24 hours have been reviewed.  Recent Lab Results         08/15/24  0440   08/14/24  2041   08/14/24  1952   08/14/24  1914        Phencyclidine   Negative           Immature Platelet Fraction 1.5       1.4       Albumin/Globulin Ratio 1.0       0.9       ABO and RH     A POS         Albumin 3.1       3.7       Alcohol, Serum       <10.0  Comment: This assay is performed for medical purposes only.       ALP 78       85       ALT 26       18       Amphetamines, Urine   Negative           Anion Gap 10.0       12.0       Appearance, UA   Clear           PTT       28.7  Comment: For Minimal Heparin " Infusion, the goal aPTT 64-85 seconds corresponds to an anti-Xa of 0.3-0.5.    For Low Intensity and High Intensity Heparin, the goal aPTT  seconds corresponds to an anti-Xa of 0.3-0.7       AST 34       26       Bacteria, UA   None Seen           Barbituates, Urine   Negative           Baso # 0.02       0.04       Basophil % 0.2       0.3       Benzodiazepine, Urine   Negative           Bilirubin (UA)   Negative           BILIRUBIN TOTAL 0.4       0.3       BUN 21.0       30.0       BUN/CREAT RATIO 21       22       Calcium 8.5       9.9       Cannabinoids, Urine   Negative           Chloride 109       108       CO2 20       21       Cocaine, Urine   Negative           Color, UA   Light-Yellow           CPK       388       Creatinine 1.00       1.36       CRP 18.70             eGFR 56       39       Eos # 0.01       0.08       Eos % 0.1       0.6       Fentanyl, Urine   Positive           Globulin, Total 3.2       3.9       Glucose 131       107       Glucose, UA   Normal           Group & Rh       A POS       Hematocrit 33.6       37.0       Hemoglobin 10.8       12.1       Immature Grans (Abs) 0.07       0.27       Immature Granulocytes 0.8       1.9       Indirect Amina GEL       NEG       INR       1.0       Ketones, UA   Negative           Lactic Acid Level       1.9       Leukocyte Esterase, UA   500           Lymph # 1.28       1.58       LYMPH % 14.7       11.1       MCH 28.7       28.9       MCHC 32.1       32.7       MCV 89.4       88.3       MDMA, Urine   Negative           Mono # 0.52       0.55       Mono % 6.0       3.8       MPV 9.5       9.2       Mucous, UA   Trace           Neut # 6.78       11.77       Neut % 78.2       82.3       NITRITE UA   2+           nRBC 0.0       0.0       Blood, UA   Trace           Opiates, Urine   Positive           pH, UA   5.5           pH, Urine   5.5           Platelet Count 278       324       Potassium 4.9       4.6       Prealbumin 24.0              PROTEIN TOTAL 6.3       7.6       Protein, UA   Trace           PT       13.2       RBC 3.76       4.19       RBC, UA   0-5           RDW 13.4       13.4       Sodium 139       141       Specific Gravity,UA   1.037           Specific Gravity, Urine Auto   1.037           Specimen Outdate       08/17/2024 23:59       Squamous Epithelial Cells, UA   Trace           Urine Culture   >/= 100,000 colonies/ml Gram-negative Rods  [P]           Urobilinogen, UA   Normal           WBC, UA   21-50           WBC 8.68       14.29                [P] - Preliminary Result                Significant Imaging: I have reviewed all pertinent imaging results/findings.    X-Ray Clavicle Right    Result Date: 8/14/2024  EXAMINATION: XR CLAVICLE RIGHT CLINICAL HISTORY: fracture; TECHNIQUE: Two-view FINDINGS: There is chronic healed fracture of the midportion of the right clavicle.  There is acute fracture of the distal clavicle.  There is no separation of acromioclavicular joint.     Acute fracture distal clavicle. Electronically signed by: Lyndon Patel Date:    08/14/2024 Time:    22:51    Assessment/Plan:     Active Diagnoses:    Diagnosis Date Noted POA    PRINCIPAL PROBLEM:  SDH (subdural hematoma) [S06.5XAA] 08/14/2024 Yes    SAH (subarachnoid hemorrhage) [I60.9] 08/14/2024 Yes    Closed fracture of multiple ribs of right side with routine healing [S22.41XD] 08/14/2024 Not Applicable    Closed fracture of right clavicle with routine healing [S42.001D] 08/14/2024 Not Applicable    Hypertension [I10] 08/14/2024 Yes    Pneumothorax [J93.9] 08/14/2024 Yes      Problems Resolved During this Admission:   85 YO F here following a fall  Ortho consulted for her right distal clavicle fracture;  she does have a history of prior right clavicle fracture that have gone on to heal   We will manage this conservatively; no operative intervention planned  Sling for comfort when OOB  RUE, ok for ADLS, OK for walker if needed no overhead activity   Follow  up with Dr Levine for repeat xrays in 2 weeks      The above findings, diagnostics, and treatment plan were discussed with Dr. Levine who is in agreement with the plan of care except as stated in additional documentation.       ELIUD De Anda  Orthopedic Trauma Surgery  [facility] - [address] [place] [address]

## 2024-08-15 NOTE — PT/OT/SLP EVAL
Occupational Therapy  Evaluation    Name: Cata Gasca  MRN: [id number]  Admitting Diagnosis: Fall   Recent Surgery: * No surgery found *      Recommendations:     Discharge therapy intensity: Moderate Intensity Therapy   Discharge Equipment Recommendations:  to be determined by next level of care  Barriers to discharge:  Other (Comment) (ongoing medical needs)    Assessment:     Cata Gasca is a 84 y.o. female with a medical diagnosis of fall from porch with R clavicle fx (non-op), SAH, SDH, multiple R sided rib fxs, pneumothorax. She is A&Ox3 and tolerated OT evaluation fairly. She was limited by significant pain this date; RN present at the end of session to administer pain meds. She presents with the following performance deficits affecting function: weakness, impaired endurance, impaired self care skills, impaired functional mobility, gait instability, impaired balance, decreased lower extremity function, decreased upper extremity function, orthopedic precautions. She required total A x2 for bed mobility and sit<>stand due to pain. Recommend moderate intensity therapy upon d/c.     Rehab Prognosis: Good; patient would benefit from acute skilled OT services to address these deficits and reach maximum level of function.       Plan:     Patient to be seen 5 x/week to address the above listed problems via self-care/home management, therapeutic activities, therapeutic exercises  Plan of Care Expires: 09/15/24  Plan of Care Reviewed with: patient    Subjective     Chief Complaint: pain  Patient/Family Comments/goals: to decrease pain     Occupational Profile:  Living Environment: Pt reports living alone in a single level home with x3 steps to enter and a R hand rail. Pt has a tub/shower combo with a shower chair.   Previous level of function: Pt reports being independent with ADLs prior.   Equipment Used at Home: none  Assistance upon Discharge: Unsure who pt will have assist from at this time.     Pain/Comfort:  Pain  Rating 1: 10/10  Location 1: rib(s)  Pain Addressed 1: Reposition, Pre-medicate for activity, Nurse notified    Patients cultural, spiritual, Religion conflicts given the current situation: no    Objective:     OT communicated with RN prior to session.      Patient was found HOB elevated with peripheral IV, oxygen, blood pressure cuff, pulse ox (continuous), telemetry, pressure relief boots upon OT entry to room.    General Precautions: Standard, fall (<160/90)  Orthopedic Precautions: RUE weight bearing as tolerated (ok for use with ADL's and RW, no overhead ROM)  Braces: Cervical collar (sling for comfort, c-collar until cleared)    Vital Signs: Blood Pressure: 131/50  Supplemental 02: nasal cannula, flow 2 L/min     Bed Mobility:    Patient completed Supine to Sit with total assistance and x2 persons  Patient completed Sit to Supine with total assistance and x2 persons    Functional Mobility/Transfers:  Patient completed Sit <> Stand Transfer with total assistance and of x2 persons  with  hand-held assist   Functional Mobility: pt not able to fully clear buttocks from bedside with sit<>stand attempt.     Activities of Daily Living:  Lower Body Dressing: total assistance to don socks.     AMPAC 6 Click ADL:  AMPAC Total Score: 12    Functional Cognition:  Orientation: oriented to Person, Place, and Time  Safety Awareness: Impaired.      Visual Perceptual Skills:  Intact    Upper Extremity Function:  Right Upper Extremity:   WBAT, ROM okay for ADLs and with RW, but no overhead ROM.     Left Upper Extremity:  WFL except limited by significant pain.     Balance:   Overall total A x2.     Therapeutic Positioning  Risk for acquired pressure injuries is increased due to relative decrease in mobility d/t hospitalization , impaired mobility, and severity of deficits resulting in prolonged immobility .    OT interventions performed during the course of today's session:   Therapeutic positioning was provided at the  conclusion of session to offload all bony prominences for the prevention and/or reduction of pressure injuries    Skin assessment: all bony prominences were assessed    Findings: known area of altered skin integrity at significant bruising on R eye.     OT recommendations for therapeutic positioning throughout hospitalization:   Follow [facility] Pressure Injury Prevention Protocol    Patient Education:  Patient provided with verbal education education regarding OT role/goals/POC, fall prevention, safety awareness, Discharge/DME recommendations, and pressure ulcer prevention.  Understanding was verbalized.     Patient left HOB elevated with all lines intact, call button in reach, wedge under R side, pressure relief boots, and RN present.    GOALS:   Multidisciplinary Problems       Occupational Therapy Goals          Problem: Occupational Therapy    Goal Priority Disciplines Outcome Interventions   Occupational Therapy Goal     OT, PT/OT Progressing    Description: LTG: Pt will perform basic ADLs and ADL transfers with Modified independence using LRAD by discharge.    STG: to be met by 9/15/24    Pt will complete grooming standing at sink with LRAD with SBA.  Pt will complete UB dressing with SBA.  Pt will complete LB dressing with SBA using LRAD.  Pt will complete toileting with SBA using LRAD.  Pt will complete functional mobility to/from toilet and toilet transfer with SBA using LRAD.                        History:     Past Medical History:   Diagnosis Date    Diabetes mellitus          Past Surgical History:   Procedure Laterality Date    VASCULAR SURGERY         Time Tracking:     OT Date of Treatment: 08/15/24  OT Start Time: 1409  OT Stop Time: 1425  OT Total Time (min): 16 min    Billable Minutes:Evaluation Moderate Complexity.     8/15/2024

## 2024-08-15 NOTE — CONSULTS
[facility]  Neurosurgery  Consult Note    Inpatient consult to Neurosurgery  Consult performed by: Jenna Zepeda, AGACNP-BC  Consult ordered by: Celeste Thomas MD        Subjective:     Chief Complaint/Reason for Admission:  Status post fall    History of Present Illness:  This is an 84-year-old  female presenting to the ED via EMS status post ground level fall.  Patient with complaints of right-sided chest pain, neck pain, back pain, poking sensation when she takes a deep breath.  Unfortunately she tripped and fell on her front yd while she was watering plants.  She fell and had positive LOC.  She did strike her head.  She thinks she is on Plavix.  Patient found lying and gravel waving the garden hose into the air to get someone's attention was on the ground for about 45 minutes.    Pan imaging performed.    CT head without contrast: Left shallow subdural hematoma and small subarachnoid hemorrhage.      CT max face: Right periorbital soft tissue inflammations.  No fractures.      CT cervical spine no acute fractures.      CTA neck performed: No hemodynamically significant stenosis or arterial injury.          Repeat CT head without contrast performed this morning 08/15/2024: Increased small right cerebellar convexity subdural hemorrhage.  Similar trace left cerebral convexity subdural and subarachnoid hemorrhages.      On physical exam the patient is sitting up in bed eating breakfast.  She is very pleasant and joking.  Obvious facial trauma to the right periorbital area with ecchymosis and edema.  States that she hurts all over but does not have a headache.  GCS is 15 and moving all extremities.  She has been in ICU overnight for close observation.              No medications prior to admission.       Review of patient's allergies indicates:  No Known Allergies    Past Medical History:   Diagnosis Date    Diabetes mellitus      Past Surgical History:   Procedure  "Laterality Date    VASCULAR SURGERY       Family History    None       Tobacco Use    Smoking status: Not on file    Smokeless tobacco: Not on file   Substance and Sexual Activity    Alcohol use: Not on file    Drug use: Not on file    Sexual activity: Not on file     Review of Systems   Constitutional:         "I hurt all over"     Objective:     Weight: 90.7 kg (199 lb 15.3 oz)  Body mass index is 33.27 kg/m².  Vital Signs (Most Recent):  Temp: 98.5 °F (36.9 °C) (08/15/24 0410)  Pulse: 88 (08/15/24 0900)  Resp: 20 (08/15/24 0900)  BP: (!) 139/56 (08/15/24 0900)  SpO2: (!) 93 % (08/15/24 0900) Vital Signs (24h Range):  Temp:  [98 °F (36.7 °C)-98.6 °F (37 °C)] 98.5 °F (36.9 °C)  Pulse:  [] 88  Resp:  [12-25] 20  SpO2:  [92 %-100 %] 93 %  BP: ()/(41-91) 139/56                         Female External Urinary Catheter w/ Suction 08/14/24 2200 (Active)   Skin no redness;no breakdown 08/15/24 0701   Tolerance no signs/symptoms of discomfort 08/15/24 0701   Suction Continuous suction at 70 mmHg 08/15/24 0701   Output (mL) 200 mL 08/15/24 0600       Physical Exam:  Nursing note and vitals reviewed.    Constitutional: She appears well-developed and well-nourished. She is not diaphoretic. No distress.     Eyes: Pupils are equal, round, and reactive to light. Conjunctivae and EOM are normal.     Cardiovascular: Normal rate.     Abdominal: Soft. Bowel sounds are normal.     Skin: Skin displays no rash on trunk. Skin displays no lesions on trunk.     Psych/Behavior: She is alert. She is oriented to person, place, and time. She has a normal mood and affect.     Musculoskeletal:        Right Upper Extremities: Muscle strength is 5/5.        Left Upper Extremities: Muscle strength is 5/5.       Right Lower Extremities: Muscle strength is 5/5.        Left Lower Extremities: Muscle strength is 5/5.     Neurological:        Sensory: There is no sensory deficit in the trunk. There is no sensory deficit in the extremities. "        DTRs: She displays no Babinski's sign on the right side. She displays no Babinski's sign on the left side.        Cranial nerves: Cranial nerve(s) II, III, IV, V, VI, VII, VIII, IX, X, XI and XII are intact.   GCS 15   PERRLA bilateral brisk-right periorbital ecchymosis and edema  Fully oriented to all spheres.    Follows commands   No facial droop, no speech issues.    Moves all extremities with no lateralizing weakness.  Sensation intact throughout.    No gross visual issues.    Cranial nerves grossly intact   No pronator drift     Motor strength 5/5 with sensation intact.    No Gabby or clonus  Normal Babinski    Nonfocal neurological exam         Significant Labs:  Recent Labs   Lab 08/14/24  1914 08/15/24  0440    139   K 4.6 4.9   * 109*   CO2 21* 20*   BUN 30.0* 21.0*   CREATININE 1.36* 1.00   CALCIUM 9.9 8.5     Recent Labs   Lab 08/14/24  1914 08/15/24  0440   WBC 14.29* 8.68   HGB 12.1 10.8*   HCT 37.0 33.6*    278     Recent Labs   Lab 08/14/24 1914   INR 1.0   APTT 28.7     Microbiology Results (last 7 days)       Procedure Component Value Units Date/Time    Urine culture [[id number]]  (Abnormal) Collected: 08/14/24 2041    Order Status: Completed Specimen: Urine Updated: 08/15/24 0759     Urine Culture >/= 100,000 colonies/ml Gram-negative Rods            Assessment/Plan:    Status post fall positive LOC  Patient on Plavix for iliac stents  Subdural and subarachnoid hemorrhage.    Repeat CT early this morning:  With increased small right cerebellar convexity subdural hemorrhage.  Similar trace left cerebral convexity subdural and subarachnoid hemorrhages.    Patient doing very well with GCS 15.  No headache.  No notable neurological deficits.    Okay for every 2 hour neurological exams   Repeat CT head without contrast at 10:00 a.m. today.  Anticipate downgrade if CT stable.    Avoid any anticoagulation at this time  Okay for PTOT   Fall precautions   SCDs   Blood pressure  less than 160/90 -patient on Cardene  Seizure precautions Keppra   No neurosurgical intervention indicated thus far         Active Diagnoses:    Diagnosis Date Noted POA    PRINCIPAL PROBLEM:  SDH (subdural hematoma) [S06.5XAA] 08/14/2024 Yes    SAH (subarachnoid hemorrhage) [I60.9] 08/14/2024 Yes    Closed fracture of multiple ribs of right side with routine healing [S22.41XD] 08/14/2024 Not Applicable    Closed fracture of right clavicle with routine healing [S42.001D] 08/14/2024 Not Applicable    Hypertension [I10] 08/14/2024 Yes    Pneumothorax [J93.9] 08/14/2024 Yes      Problems Resolved During this Admission:       Thank you for your consult. I will follow-up with patient. Please contact us if you have any additional questions.    Jenna Zepeda, [facility]  Neurosurgery  [facility] - [address] [place] [address] ICU

## 2024-08-15 NOTE — PLAN OF CARE
Problem: Occupational Therapy  Goal: Occupational Therapy Goal  Description: LTG: Pt will perform basic ADLs and ADL transfers with Modified independence using LRAD by discharge.    STG: to be met by 9/15/24    Pt will complete grooming standing at sink with LRAD with SBA.  Pt will complete UB dressing with SBA.  Pt will complete LB dressing with SBA using LRAD.  Pt will complete toileting with SBA using LRAD.  Pt will complete functional mobility to/from toilet and toilet transfer with SBA using LRAD.   Outcome: Progressing

## 2024-08-15 NOTE — NURSING
Nurses Note -- 4 Eyes      8/15/2024   1:12 AM      Skin assessed during: Q Shift Change      [] No Altered Skin Integrity Present    []Prevention Measures Documented      [x] Yes- Altered Skin Integrity Present or Discovered   [x] LDA Added if Not in Epic (Describe Wound)   [x] New Altered Skin Integrity was Present on Admit and Documented in LDA   [x] Wound Image Taken    Wound Care Consulted? Yes    Attending Nurse:  Pete MILLS    Second RN/Staff Member:   Beto MILLS

## 2024-08-15 NOTE — CONSULTS
"   Trauma Surgery   Activation Note    Patient Name: Cata Gasca  MRN: [id number]   YOB: 1940  Date: 08/14/2024    LEVEL 2 TRAUMA     Subjective:   History of present illness: Patient is an approximately 81 year old female who fell off her porch approximately 2 feet per EMS, states + LOC. Pt laid on the ground for an unknown amount of time attempting to draw attention to herself, comes in wet from water hose. C/O right shoulder pain, no deformity appreciated, abrasion to right posterior upper arm. Right periorbital bruising with subconjunctival hemorrhage. C/O tenderness to thoracic area upon palpitation. Bilateral lower legs swollen from what appears to be venous stasis, dressing to left lower leg.     Primary Survey:  A intact   B Breathing comfortably on 2L NC, talking in full sentences   C 2+ RP b/l, 2+DP b/l, bilateral lower extremity pitting edema   D GCS 15(E 4, V 5, M 6)    E exposed, log-rolled and examined (see below)   F See below     VITAL SIGNS: 24 HR MIN & MAX LAST   Temp  Min: 98.2 °F (36.8 °C)  Max: 98.6 °F (37 °C)  98.6 °F (37 °C)   BP  Min: 164/67  Max: 222/91  (!) 164/67    Pulse  Min: 71  Max: 93  93    Resp  Min: 18  Max: 21  (!) 21    SpO2  Min: 96 %  Max: 100 %  100 %      HT: 5' 5" (165.1 cm)  WT: 90.7 kg (200 lb)  BMI: 33.3     FAST: deferred    Medications/transfusions received en-route: none     Medications/transfusions received in trauma bay: none    Scheduled Meds:   acetaminophen  650 mg Oral Q4H    docusate sodium  100 mg Oral BID    famotidine  20 mg Oral BID    fentaNYL        levETIRAcetam  500 mg Oral BID    methocarbamoL  500 mg Oral Q8H    ondansetron        polyethylene glycol  17 g Oral BID    sodium chloride 0.9%  1,000 mL Intravenous Once     Continuous Infusions:   0.9% NaCl   Intravenous Continuous 100 mL/hr at 08/14/24 2038 New Bag at 08/14/24 2038    nicardipine  0-15 mg/hr Intravenous Continuous         PRN Meds:  Current Facility-Administered Medications:    "  bisacodyL, 10 mg, Rectal, Daily PRN    fentaNYL, , ,     hydrALAZINE, 10 mg, Intravenous, Q6H PRN    melatonin, 6 mg, Oral, Nightly PRN    morphine, 2 mg, Intravenous, Q2H PRN    ondansetron, , ,     oxyCODONE, 5 mg, Oral, Q4H PRN    ROS: 12 point ROS negative except as stated in HPI    Allergies: Metformin   PMH: possible cardiac stent on plavix, bilateral lower extremity venous stasis with pitting edema  PSH:  per EMS possible peripheral vascular and cardiac stents  Social history:  unknown  Objective:   Secondary Survey:   General: Well developed, well nourished, AAOx3  Neuro: CNII-XII grossly intact  HEENT:  Normocephalic, right periorbital bruising/swelling with sub-conjuctival hemorrhage, PERRL, cervical collar in place  CV: S1S2  Pulse: 2+ RP b/l, 2+ DP b/l   Resp/chest:  Non-labored breathing, satting 90+% on nasal cannula  GI:  Abdomen soft, non-tender, non-distended  Extremities: Right arm guarding, decreased movement d/t pain, moves all other extremities purposefully, no obvious gross deformities.  Back/Spine: Thoracic tenderness, no palpable step offs or deformities.  Cervical back: Normal. No tenderness.  Thoracic back: Tenderness upon palpation  Lumbar back: Normal. No tenderness.  Skin/wounds:  Warm, well perfused, bruising right upper posterior arm, redness bilateral lower extremities  Psych: anxious, appropriate to situation, cooperative    Labs: all labs reviewed, wnl except for creatinine 1.36.   Lactic acid 1.9. ETOH negative,       Imaging:  Imaging Results              CT 3D Rendering WO Independent Workstation (In process)                      CT Chest Abdomen Pelvis With IV Contrast (XPD) Routine (Final result)  Result time 08/14/24 20:24:38      Final result by Misael Ledesma MD (08/14/24 20:24:38)                   Impression:      Multiple right rib fractures with very small right pneumothorax.    Fractures of the proximal and distal portions of the right  clavicle.      Electronically signed by: Misael Ledesma  Date:    08/14/2024  Time:    20:24               Narrative:    EXAMINATION:  CT CHEST ABDOMEN PELVIS WITH IV CONTRAST (XPD)    CLINICAL HISTORY:  Trauma;    TECHNIQUE:  CT imaging of the chest, abdomen and pelvis after IV contrast. Axial, coronal and sagittal reformatted images reviewed. Dose length product is 1333 mGycm. Automatic exposure control, adjustment of mA/kV or iterative reconstruction technique used to limit radiation dose.    COMPARISON:  No relevant comparison studies available at the time of dictation.    FINDINGS:  No mediastinal hematoma or significant pericardial fluid.  Very small right pneumothorax.  Mild atelectasis bilaterally.    No defined hepatic or splenic laceration.  Previous cholecystectomy.  Normal pancreas and adrenal glands.  Areas of right renal cortical scarring.  No traumatic renal findings.  Normal bladder.  Small hiatal hernia.  No mesenteric hematoma, pneumoperitoneum or ascites.  Left iliac venous stent.    Fractures of the posterior right 1st through 7th ribs.  The right 4th, 5th and 6th rib fractures are displaced and overlapped.  Minimally displaced fractures of the lateral right 4th through 7th ribs.  Minimally displaced fracture at the right 1st costochondral junction.  Minimally displaced medial right clavicle fracture.  Displaced fracture of the distal right clavicle.                                       CT Cervical Spine Without Contrast (Final result)  Result time 08/14/24 20:01:58      Final result by Lyndon Patel MD (08/14/24 20:01:58)                   Impression:      No acute fracture or malalignment identified.      Electronically signed by: Lyndon Patel  Date:    08/14/2024  Time:    20:01               Narrative:    EXAMINATION:  CT CERVICAL SPINE WITHOUT CONTRAST    CLINICAL HISTORY:  Trauma.    TECHNIQUE:  Multidetector axial images were performed of the cervical spine without and.  Images were  reconstructed.    Automated exposure control was utilized to minimize radiation dose.  DLP 1528.    COMPARISON:  None available.    FINDINGS:  There is solid ACDF at C4, C5, C6 and C7.  There are interbody fusions at C4-C5, C5-C6 and C6-C7.  Cervical vertebrae stature is maintained and alignment is unremarkable.  No acute fracture or malalignment identified.  There are mild degenerative changes which cause some impression ventral thecal sac without significant narrowings of the neural foramen..  There is no prevertebral soft tissue prominence.    This study does not exclude the possibility of intrathecal soft tissue, ligamentous or vascular injury.                                       CT Maxillofacial Without Contrast (Final result)  Result time 08/14/24 20:01:46      Final result by Lyndon Patel MD (08/14/24 20:01:46)                   Impression:      1. Right periorbital soft tissue inflammations    2. No acute maxillofacial fracture identified      Electronically signed by: Lyndon Patel  Date:    08/14/2024  Time:    20:01               Narrative:    EXAMINATION:  CT MAXILLOFACIAL WITHOUT CONTRAST    CLINICAL HISTORY:  Facial trauma, blunt;    TECHNIQUE:  Multidetector axial images were performed maxillofacial without contrast and images reformatted.    Dose length product of 1528 mGycm. Automated exposure control was utilized to minimize radiation dose.    COMPARISON:  None available.    FINDINGS:  There are right periorbital soft tissue inflammations.  There are no fractures of the orbital walls. The globes are unremarkable and no intra-orbital inflammations or emphysema identified. There are no fractures of the nasal bones, pterygoids, zygomatic arches, paranasal sinuses walls or the mandibles.                                       CT Head Without Contrast (Final result)  Result time 08/14/24 19:57:45      Final result by Lyndon Patel MD (08/14/24 19:57:45)                   Impression:      Left  shallow subdural hematoma and small subarachnoid hemorrhage.      Electronically signed by: Lyndon Patel  Date:    08/14/2024  Time:    19:57               Narrative:    EXAMINATION:  CT HEAD WITHOUT CONTRAST    CLINICAL HISTORY:  Trauma;    TECHNIQUE:  Sequential axial images were performed of the brain without contrast.    Dose product length of 1528 mGycm. Automated exposure control was utilized to minimize radiation dose.    COMPARISON:  None available.    FINDINGS:  There is left cerebral convexity shallow subdural hematoma with maximum thickness of 3.5 mm.  There is also adjacent left frontal small subarachnoid hemorrhage on image 29 series 3.  There is no mass effect, midline shift or hydrocephalus. There is no sulcal effacement or low attenuation changes to suggest recent large vessel territory infarction. Chronic appearing periventricular and subcortical white matter low attenuation changes are present and are consistent with chronic microangiopathic ischemia. The ventricular system and sulcal markings prominence is consistent with atrophy.  No acute depressed skull fracture identified.    Maxillofacial findings are described on separate report.                                       X-Ray Pelvis Routine AP (Final result)  Result time 08/14/24 20:38:01      Final result by Lyndon Patel MD (08/14/24 20:38:01)                   Impression:      No acute osseous abnormality identified.      Electronically signed by: Lyndon Patel  Date:    08/14/2024  Time:    20:38               Narrative:    EXAMINATION:  Pelvis XR PELVIS ROUTINE AP    CLINICAL HISTORY:  Trauma.    TECHNIQUE:  One view    COMPARISON:  None available.    FINDINGS:  Bilateral iliac crests were excluded on the image.  There is left iliac stent graft.  Demineralization of the bones.  As visualized, no acute fracture or dislocation identified                                       X-Ray Chest 1 View (In process)                      Assessment &  Plan:   Subdural hemorrhage  Admission to ICU  Neuro checks q1h  Repeat CT in am  Withholding Lovenox at this time  Consulted NSGY  Keppra    Subarachnoid hemorrhage  Admission to ICU  Neuro checks q1h  Repeat CT in am  Withholding Lovenox at this time  Consulted NSGY  Keppra    Right 1-7 Rib fractures  Admission to ICU  3D Recon  VS with pulse ox q1h  CTA neck  MMPC  Aggressive IS  Lidocaine patch    Right clavicle fracture  Conulted ortho  MMPC    Small right pneumothorax  CXR in AM  IS  VS with pulse ox q1H    Hypertensive  Cardene  Vsq1h  PRN Hydralazine    Will continue to follow imaging

## 2024-08-15 NOTE — PLAN OF CARE
Problem: Physical Therapy  Goal: Physical Therapy Goal  Description: Goals to be met by: d/c     Patient will increase functional independence with mobility by performin. Supine to sit with Stand-by Assistance  2. Sit to supine with Stand-by Assistance  3. Sit to stand transfer with Stand-by Assistance  4. Bed to chair transfer with Stand-by Assistance using Least Restrictive Assistive Device  5. Gait  x 50 feet with Supervision using Least Restrictive Assistive Device.     Outcome: Progressing

## 2024-08-15 NOTE — H&P
"   Trauma Surgery   History & Physical    Patient Name: Cata Gasca  MRN: [id number]   YOB: 1940  Date: 08/14/2024    LEVEL 2 TRAUMA     Subjective:   History of present illness: Patient is an approximately 81 year old female who fell off her porch approximately 2 feet per EMS, states + LOC. Pt laid on the ground for an unknown amount of time attempting to draw attention to herself, comes in wet from water hose. C/O right shoulder pain, no deformity appreciated, abrasion to right posterior upper arm. Right periorbital bruising with subconjunctival hemorrhage. C/O tenderness to thoracic area upon palpitation. Bilateral lower legs swollen from what appears to be venous stasis, dressing to left lower leg.     Primary Survey:  A intact   B Breathing comfortably on 2L NC, talking in full sentences   C 2+ RP b/l, 2+DP b/l, bilateral lower extremity pitting edema   D GCS 15(E 4, V 5, M 6)    E exposed, log-rolled and examined (see below)   F See below     VITAL SIGNS: 24 HR MIN & MAX LAST   Temp  Min: 98.2 °F (36.8 °C)  Max: 98.6 °F (37 °C)  98.6 °F (37 °C)   BP  Min: 151/58  Max: 222/91  (!) 151/58    Pulse  Min: 71  Max: 93  93    Resp  Min: 18  Max: 21  (!) 21    SpO2  Min: 96 %  Max: 100 %  100 %      HT: 5' 5" (165.1 cm)  WT: 90.7 kg (200 lb)  BMI: 33.3     FAST: deferred    Medications/transfusions received en-route: none     Medications/transfusions received in trauma bay: none    Scheduled Meds:   acetaminophen  650 mg Oral Q4H    docusate sodium  100 mg Oral BID    famotidine  20 mg Oral BID    fentaNYL        levETIRAcetam  500 mg Oral BID    methocarbamoL  500 mg Oral Q8H    ondansetron        polyethylene glycol  17 g Oral BID    sodium chloride 0.9%  1,000 mL Intravenous Once     Continuous Infusions:   0.9% NaCl   Intravenous Continuous 100 mL/hr at 08/14/24 2038 New Bag at 08/14/24 2038    nicardipine  0-15 mg/hr Intravenous Continuous 25 mL/hr at 08/14/24 2052 5 mg/hr at 08/14/24 2052     PRN " Meds:  Current Facility-Administered Medications:     bisacodyL, 10 mg, Rectal, Daily PRN    fentaNYL, , ,     hydrALAZINE, 10 mg, Intravenous, Q6H PRN    melatonin, 6 mg, Oral, Nightly PRN    morphine, 2 mg, Intravenous, Q2H PRN    ondansetron, , ,     oxyCODONE, 5 mg, Oral, Q4H PRN    ROS: 12 point ROS negative except as stated in HPI    Allergies: Metformin   PMH: possible cardiac stent on plavix, bilateral lower extremity venous stasis with pitting edema  PSH:  per EMS possible peripheral vascular and cardiac stents  Social history:  unknown  Objective:   Secondary Survey:   General: Well developed, well nourished, AAOx3  Neuro: CNII-XII grossly intact  HEENT:  Normocephalic, right periorbital bruising/swelling with sub-conjuctival hemorrhage, PERRL, cervical collar in place  CV: S1S2  Pulse: 2+ RP b/l, 2+ DP b/l   Resp/chest:  Non-labored breathing, satting 90+% on nasal cannula  GI:  Abdomen soft, non-tender, non-distended  Extremities: Right arm guarding, decreased movement d/t pain, moves all other extremities purposefully, no obvious gross deformities.  Back/Spine: Thoracic tenderness, no palpable step offs or deformities.  Cervical back: Normal. No tenderness.  Thoracic back: Tenderness upon palpation  Lumbar back: Normal. No tenderness.  Skin/wounds:  Warm, well perfused, bruising right upper posterior arm, redness bilateral lower extremities  Psych: anxious, appropriate to situation, cooperative    Labs: all labs reviewed, wnl except for creatinine 1.36.   Lactic acid 1.9. ETOH negative,       Imaging:  Imaging Results              CT 3D Rendering WO Independent Workstation (In process)                      CT Chest Abdomen Pelvis With IV Contrast (XPD) Routine (Final result)  Result time 08/14/24 20:24:38      Final result by Misael Ledesma MD (08/14/24 20:24:38)                   Impression:      Multiple right rib fractures with very small right pneumothorax.    Fractures of the proximal and  distal portions of the right clavicle.      Electronically signed by: Misael Ledesma  Date:    08/14/2024  Time:    20:24               Narrative:    EXAMINATION:  CT CHEST ABDOMEN PELVIS WITH IV CONTRAST (XPD)    CLINICAL HISTORY:  Trauma;    TECHNIQUE:  CT imaging of the chest, abdomen and pelvis after IV contrast. Axial, coronal and sagittal reformatted images reviewed. Dose length product is 1333 mGycm. Automatic exposure control, adjustment of mA/kV or iterative reconstruction technique used to limit radiation dose.    COMPARISON:  No relevant comparison studies available at the time of dictation.    FINDINGS:  No mediastinal hematoma or significant pericardial fluid.  Very small right pneumothorax.  Mild atelectasis bilaterally.    No defined hepatic or splenic laceration.  Previous cholecystectomy.  Normal pancreas and adrenal glands.  Areas of right renal cortical scarring.  No traumatic renal findings.  Normal bladder.  Small hiatal hernia.  No mesenteric hematoma, pneumoperitoneum or ascites.  Left iliac venous stent.    Fractures of the posterior right 1st through 7th ribs.  The right 4th, 5th and 6th rib fractures are displaced and overlapped.  Minimally displaced fractures of the lateral right 4th through 7th ribs.  Minimally displaced fracture at the right 1st costochondral junction.  Minimally displaced medial right clavicle fracture.  Displaced fracture of the distal right clavicle.                                       CT Cervical Spine Without Contrast (Final result)  Result time 08/14/24 20:01:58      Final result by Lyndon Patel MD (08/14/24 20:01:58)                   Impression:      No acute fracture or malalignment identified.      Electronically signed by: Lyndon Patel  Date:    08/14/2024  Time:    20:01               Narrative:    EXAMINATION:  CT CERVICAL SPINE WITHOUT CONTRAST    CLINICAL HISTORY:  Trauma.    TECHNIQUE:  Multidetector axial images were performed of the cervical spine  without and.  Images were reconstructed.    Automated exposure control was utilized to minimize radiation dose.  DLP 1528.    COMPARISON:  None available.    FINDINGS:  There is solid ACDF at C4, C5, C6 and C7.  There are interbody fusions at C4-C5, C5-C6 and C6-C7.  Cervical vertebrae stature is maintained and alignment is unremarkable.  No acute fracture or malalignment identified.  There are mild degenerative changes which cause some impression ventral thecal sac without significant narrowings of the neural foramen..  There is no prevertebral soft tissue prominence.    This study does not exclude the possibility of intrathecal soft tissue, ligamentous or vascular injury.                                       CT Maxillofacial Without Contrast (Final result)  Result time 08/14/24 20:01:46      Final result by Lyndon Patel MD (08/14/24 20:01:46)                   Impression:      1. Right periorbital soft tissue inflammations    2. No acute maxillofacial fracture identified      Electronically signed by: Lyndon Patel  Date:    08/14/2024  Time:    20:01               Narrative:    EXAMINATION:  CT MAXILLOFACIAL WITHOUT CONTRAST    CLINICAL HISTORY:  Facial trauma, blunt;    TECHNIQUE:  Multidetector axial images were performed maxillofacial without contrast and images reformatted.    Dose length product of 1528 mGycm. Automated exposure control was utilized to minimize radiation dose.    COMPARISON:  None available.    FINDINGS:  There are right periorbital soft tissue inflammations.  There are no fractures of the orbital walls. The globes are unremarkable and no intra-orbital inflammations or emphysema identified. There are no fractures of the nasal bones, pterygoids, zygomatic arches, paranasal sinuses walls or the mandibles.                                       CT Head Without Contrast (Final result)  Result time 08/14/24 19:57:45      Final result by Lyndon Patel MD (08/14/24 19:57:45)                    Impression:      Left shallow subdural hematoma and small subarachnoid hemorrhage.      Electronically signed by: Lyndon Patel  Date:    08/14/2024  Time:    19:57               Narrative:    EXAMINATION:  CT HEAD WITHOUT CONTRAST    CLINICAL HISTORY:  Trauma;    TECHNIQUE:  Sequential axial images were performed of the brain without contrast.    Dose product length of 1528 mGycm. Automated exposure control was utilized to minimize radiation dose.    COMPARISON:  None available.    FINDINGS:  There is left cerebral convexity shallow subdural hematoma with maximum thickness of 3.5 mm.  There is also adjacent left frontal small subarachnoid hemorrhage on image 29 series 3.  There is no mass effect, midline shift or hydrocephalus. There is no sulcal effacement or low attenuation changes to suggest recent large vessel territory infarction. Chronic appearing periventricular and subcortical white matter low attenuation changes are present and are consistent with chronic microangiopathic ischemia. The ventricular system and sulcal markings prominence is consistent with atrophy.  No acute depressed skull fracture identified.    Maxillofacial findings are described on separate report.                                       X-Ray Pelvis Routine AP (Final result)  Result time 08/14/24 20:38:01      Final result by Lyndon Patel MD (08/14/24 20:38:01)                   Impression:      No acute osseous abnormality identified.      Electronically signed by: Lyndon Patel  Date:    08/14/2024  Time:    20:38               Narrative:    EXAMINATION:  Pelvis XR PELVIS ROUTINE AP    CLINICAL HISTORY:  Trauma.    TECHNIQUE:  One view    COMPARISON:  None available.    FINDINGS:  Bilateral iliac crests were excluded on the image.  There is left iliac stent graft.  Demineralization of the bones.  As visualized, no acute fracture or dislocation identified                                       X-Ray Chest 1 View (In process)                       Assessment & Plan:   Subdural hemorrhage  Admission to ICU  Neuro checks q1h  Repeat CT in am  Withholding Lovenox at this time  Consulted NSGY  Keppra    Subarachnoid hemorrhage  Admission to ICU  Neuro checks q1h  Repeat CT in am  Withholding Lovenox at this time  Consulted NSGY  Keppra    Right 1-7 Rib fractures  Admission to ICU  3D Recon  VS with pulse ox q1h  CTA neck  MMPC  Aggressive IS  Lidocaine patch    Right clavicle fracture  Conulted ortho  MMPC    Small right pneumothorax  CXR in AM  IS  VS with pulse ox q1H    Hypertensive  Cardene  Vsq1h  PRN Hydralazine    Will continue to follow imaging

## 2024-08-15 NOTE — PT/OT/SLP EVAL
Physical Therapy Evaluation    Patient Name:  Cata Gasca   MRN:  [id number]    Recommendations:     Discharge therapy intensity: Moderate Intensity Therapy   Discharge Equipment Recommendations:  (TBD)   Barriers to discharge: Decreased caregiver support, Impaired mobility, and Ongoing medical needs    Assessment:     Cata Gasca is a 84 y.o. female admitted with a medical diagnosis of fall from porch with R clavicle fx (non-op), SAH, SDH, multiple R sided rib fxs, pneumothorax.  She presents with the following impairments/functional limitations: weakness, impaired functional mobility, impaired cardiopulmonary response to activity, impaired endurance, gait instability, impaired balance, decreased lower extremity function, orthopedic precautions. Pt tolerated session fair, significantly limited 2/2 pain with mobility. Pt currently requiring Total A x 2 for bed mobility and sit <> stand 2/2 pain. Pt lives alone and would benefit from moderate intensity therapy at d/c.     Rehab Prognosis: Good; patient would benefit from acute skilled PT services to address these deficits and reach maximum level of function.    Recent Surgery: * No surgery found *      Plan:     During this hospitalization, patient would benefit from acute PT services 5 x/week to address the identified rehab impairments via gait training, therapeutic activities, therapeutic exercises and progress toward the following goals:    Plan of Care Expires:  09/21/24    Subjective     Chief Complaint: pain  Patient/Family Comments/goals: to decrease pain  Pain/Comfort:  Pain Rating 1: 10/10  Location - Orientation 1:  (ribs)    Patients cultural, spiritual, Congregational conflicts given the current situation:      Living Environment:  Pt lives alone in a [facility] with 3 steps to enter and R railign. Fell on porch trying to garden.   Prior to admission, patients level of function was I.  Equipment used at home: none.  DME owned (not currently used): none.  Upon  discharge, patient will have assistance from TBD.    Objective:     Communicated with RN prior to session.  Patient found HOB elevated with peripheral IV, oxygen, blood pressure cuff, telemetry, pressure relief boots, pulse ox (continuous)  upon PT entry to room.    General Precautions: Standard, fall <160  Orthopedic Precautions:RUE weight bearing as tolerated (ok for use with ADL's and RW, no overhead ROM)   Braces: Cervical collar (sling for comfort, c-collar until cleared)  Respiratory Status: Nasal cannula, flow 2 L/min  Blood Pressure: 131/50      Exams:  RLE Strength: WFL  LLE Strength: WFL  Skin integrity:  areas of known injury from fall, bruising/swelling to R eye      Functional Mobility:  Bed Mobility:     Supine to Sit: total assistance and of 2 persons  Sit to Supine: total assistance and of 2 persons  Transfers:     Sit to Stand:  total assistance and of 2 persons with hand-held assist, partial glut clearance at EOB      AM-PAC 6 CLICK MOBILITY  Total Score:9       Treatment & Education:    Patient provided with verbal education education regarding PT role/goals/POC, fall prevention, and safety awareness.  Understanding was verbalized.     Patient left left sidelying with all lines intact, call button in reach, wedge under R side, pressure relief boots, and RN present.    GOALS:   Multidisciplinary Problems       Physical Therapy Goals          Problem: Physical Therapy    Goal Priority Disciplines Outcome Goal Variances Interventions   Physical Therapy Goal     PT, PT/OT Progressing     Description: Goals to be met by: d/c     Patient will increase functional independence with mobility by performin. Supine to sit with Stand-by Assistance  2. Sit to supine with Stand-by Assistance  3. Sit to stand transfer with Stand-by Assistance  4. Bed to chair transfer with Stand-by Assistance using Least Restrictive Assistive Device  5. Gait  x 50 feet with Supervision using Least Restrictive Assistive  Device.                          History:     Past Medical History:   Diagnosis Date    Diabetes mellitus        Past Surgical History:   Procedure Laterality Date    VASCULAR SURGERY         Time Tracking:     PT Received On: 08/15/24  PT Start Time: 1405     PT Stop Time: 1425  PT Total Time (min): 20 min     Billable Minutes: Evaluation 20      08/15/2024

## 2024-08-15 NOTE — PLAN OF CARE
Problem: Physical Therapy  Goal: Physical Therapy Goal  Description: Goals to be met by: d/c     Patient will increase functional independence with mobility by performin. Supine to sit with Stand-by Assistance  2. Sit to supine with Stand-by Assistance  3. Sit to stand transfer with Stand-by Assistance  4. Bed to chair transfer with Stand-by Assistance using Least Restrictive Assistive Device  5. Gait  x 50 feet with Supervision using Least Restrictive Assistive Device.     8/15/2024 1619 by Elena Marsh, PT  Outcome: Progressing  8/15/2024 1616 by Elena Marsh, PT  Outcome: Progressing

## 2024-08-15 NOTE — NURSING
Nurses Note -- 4 Eyes      8/15/2024   9:45 AM      Skin assessed during: Daily Assessment      [] No Altered Skin Integrity Present    [x]Prevention Measures Documented      [x] Yes- Altered Skin Integrity Present or Discovered   [] LDA Added if Not in Epic (Describe Wound)   [] New Altered Skin Integrity was Present on Admit and Documented in LDA   [] Wound Image Taken    Wound Care Consulted? Yes    Attending Nurse:  Sergio Acosta RN/Staff Member:   LINA Mckeon

## 2024-08-15 NOTE — ED PROVIDER NOTES
"Encounter Date: 8/14/2024    SCRIBE #1 NOTE: I, Opal Murphy, am scribing for, and in the presence of,  Celeste Thomas DO. I have scribed the following portions of the note - Other sections scribed: HPI, ROS, PE.       History   No chief complaint on file.    The patient is an 84 year old female presenting to the ED via EMS as a level 2 trauma following a ground-level fall. The patient complains of right-sided chest pain, neck pain, back pain, and a "poking" sensation whenever she takes a deep breath; she denies hip pain. Per EMS, the patient tripped in her front yard, and she fell, hit her head, and lost consciousness. The patient was found laying in gravel waving he garden hose in the air to get someone's attention; she was on the ground for 45 minutes before EMS arrived. EMS notes that the patient is currently on Plavix and has 2 previous shoulder injuries without surgery; her blood pressure was fluctuating en route.     The history is provided by the patient and the EMS personnel. No  was used.     Review of patient's allergies indicates:  No Known Allergies  Past Medical History:   Diagnosis Date    Diabetes mellitus      Past Surgical History:   Procedure Laterality Date    VASCULAR SURGERY       No family history on file.     Review of Systems   Cardiovascular:  Positive for chest pain.   Musculoskeletal:  Positive for back pain and neck pain.   All other systems reviewed and are negative.      Physical Exam     Initial Vitals   BP Pulse Resp Temp SpO2   08/14/24 1904 08/14/24 1904 08/14/24 1904 08/14/24 1904 08/14/24 1807   (!) 222/91 71 19 98.2 °F (36.8 °C) 98 %      MAP       --                Physical Exam    Nursing note and vitals reviewed.  Constitutional: She appears well-developed and well-nourished. She is not diaphoretic. She does not appear ill. No distress.   HENT:   Head: Normocephalic and atraumatic.   Right Ear: Tympanic membrane and external ear normal. No hemotympanum. "   Left Ear: Tympanic membrane and external ear normal. No hemotympanum.   Nose: No nasal deformity, septal deviation or nasal septal hematoma.   Mouth/Throat: Oropharynx is clear and moist and mucous membranes are normal.   Eyes: EOM are normal. Pupils are equal, round, and reactive to light.   Pupils are 3mm to 2mm bilaterally; right periorbital ecchymosis, subconjunctival hemorrhage.    Neck: Neck supple. No tracheal deviation present.   No cervical spine tenderness    Normal range of motion.  Cardiovascular:  Normal rate, regular rhythm, normal heart sounds and intact distal pulses.           Pulses:       Radial pulses are 2+ on the right side and 2+ on the left side.        Dorsalis pedis pulses are 2+ on the right side and 2+ on the left side.   2+ radial and DP pulses    Pulmonary/Chest: Breath sounds normal. No respiratory distress. She exhibits no tenderness.   No signs of trauma. Bilateral breath sounds.    Abdominal: Abdomen is soft. She exhibits no distension. There is no abdominal tenderness.   No signs of trauma    No right CVA tenderness.  No left CVA tenderness. There is no rebound.   Musculoskeletal:         General: Normal range of motion.      Cervical back: Normal range of motion and neck supple. No spinous process tenderness or muscular tenderness.      Thoracic back: Normal. No bony tenderness.      Lumbar back: Normal. No bony tenderness.      Comments: Chronic venous stasis to the bilateral lower extremities; bilateral leg swelling that is more severe on the left; upper and mid-thoracic tenderness; contusion to the right posterior shoulder.      Neurological: She is alert and oriented to person, place, and time. She has normal strength. No cranial nerve deficit or sensory deficit. GCS score is 15. GCS eye subscore is 4. GCS verbal subscore is 5. GCS motor subscore is 6.   Skin: Skin is warm and dry. Capillary refill takes less than 2 seconds. No abrasion and no laceration noted. No pallor.    Psychiatric: She has a normal mood and affect. Her behavior is normal.         ED Course   Critical Care    Date/Time: 8/14/2024 7:00 PM    Performed by: Celeste Thomas MD  Authorized by: Celeste Thomas MD  Direct patient critical care time: 10 minutes  Additional history critical care time: 5 minutes  Ordering / reviewing critical care time: 5 minutes  Documentation critical care time: 5 minutes  Consulting other physicians critical care time: 7 minutes  Total critical care time (exclusive of procedural time) : 32 minutes  Critical care time was exclusive of separately billable procedures and treating other patients and teaching time.  Critical care was necessary to treat or prevent imminent or life-threatening deterioration of the following conditions: cardiac failure, CNS failure or compromise and trauma.  Critical care was time spent personally by me on the following activities: blood draw for specimens, discussions with consultants, development of treatment plan with patient or surrogate, evaluation of patient's response to treatment, examination of patient, ordering and review of laboratory studies, ordering and review of radiographic studies, ordering and performing treatments and interventions, obtaining history from patient or surrogate and re-evaluation of patient's condition.        Labs Reviewed   COMPREHENSIVE METABOLIC PANEL - Abnormal       Result Value    Sodium 141      Potassium 4.6      Chloride 108 (*)     CO2 21 (*)     Glucose 107      Blood Urea Nitrogen 30.0 (*)     Creatinine 1.36 (*)     Calcium 9.9      Protein Total 7.6      Albumin 3.7      Globulin 3.9 (*)     Albumin/Globulin Ratio 0.9 (*)     Bilirubin Total 0.3      ALP 85      ALT 18      AST 26      eGFR 39      Anion Gap 12.0      BUN/Creatinine Ratio 22     URINALYSIS, REFLEX TO URINE CULTURE - Abnormal    Color, UA Light-Yellow      Appearance, UA Clear      Specific Gravity, UA 1.037 (*)     pH, UA 5.5      Protein, UA  Trace (*)     Glucose, UA Normal      Ketones, UA Negative      Blood, UA Trace (*)     Bilirubin, UA Negative      Urobilinogen, UA Normal      Nitrites, UA 2+ (*)     Leukocyte Esterase,  (*)     RBC, UA 0-5      WBC, UA 21-50 (*)     Bacteria, UA None Seen      Squamous Epithelial Cells, UA Trace      Mucous, UA Trace (*)    DRUG SCREEN, URINE (BEAKER) - Abnormal    Amphetamines, Urine Negative      Barbiturates, Urine Negative      Benzodiazepine, Urine Negative      Cannabinoids, Urine Negative      Cocaine, Urine Negative      Fentanyl, Urine Positive (*)     MDMA, Urine Negative      Opiates, Urine Positive (*)     Phencyclidine, Urine Negative      pH, Urine 5.5      Specific Gravity, Urine Auto 1.037 (*)     Narrative:     Cut off concentrations:    Amphetamines - 1000 ng/ml  Barbiturates - 200 ng/ml  Benzodiazepine - 200 ng/ml  Cannabinoids (THC) - 50 ng/ml  Cocaine - 300 ng/ml  Fentanyl - 1.0 ng/ml  MDMA - 500 ng/ml  Opiates - 300 ng/ml   Phencyclidine (PCP) - 25 ng/ml    Specimen submitted for drug analysis and tested for pH and specific gravity in order to evaluate sample integrity. Suspect tampering if specific gravity is <1.003 and/or pH is not within the range of 4.5 - 8.0  False negatives may result form substances such as bleach added to urine.  False positives may result for the presence of a substance with similar chemical structure to the drug or its metabolite.    This test provides only a PRELIMINARY analytical test result. A more specific alternate chemical method must be used in order to obtain a confirmed analytical result. Gas chromatography/mass spectrometry (GC/MS) is the preferred confirmatory method. Other chemical confirmation methods are available. Clinical consideration and professional judgement should be applied to any drug of abuse test result, particularly when preliminary positive results are used.    Positive results will be confirmed only at the physicians request.  Unconfirmed screening results are to be used only for medical purposes (treatment).        CBC WITH DIFFERENTIAL - Abnormal    WBC 14.29 (*)     RBC 4.19 (*)     Hgb 12.1      Hct 37.0      MCV 88.3      MCH 28.9      MCHC 32.7 (*)     RDW 13.4      Platelet 324      MPV 9.2      Neut % 82.3      Lymph % 11.1      Mono % 3.8      Eos % 0.6      Basophil % 0.3      Lymph # 1.58      Neut # 11.77 (*)     Mono # 0.55      Eos # 0.08      Baso # 0.04      IG# 0.27 (*)     IG% 1.9      NRBC% 0.0      IPF 1.4     CK - Abnormal    Creatine Kinase 388 (*)    PROTIME-INR - Normal    PT 13.2      INR 1.0     APTT - Normal    PTT 28.7     LACTIC ACID, PLASMA - Normal    Lactic Acid Level 1.9     ALCOHOL,MEDICAL (ETHANOL) - Normal    Ethanol Level <10.0     CULTURE, URINE   CBC W/ AUTO DIFFERENTIAL    Narrative:     The following orders were created for panel order CBC auto differential.  Procedure                               Abnormality         Status                     ---------                               -----------         ------                     CBC with Differential[[id number]]       Abnormal            Final result                 Please view results for these tests on the individual orders.   TYPE & SCREEN    Group & Rh A POS      Indirect Amina GEL NEG      Specimen Outdate 08/17/2024 23:59     ABORH RETYPE    ABORH Retype A POS            Imaging Results              X-Ray Clavicle Right (Final result)  Result time 08/14/24 22:51:21      Final result by Lyndon Patel MD (08/14/24 22:51:21)                   Impression:      Acute fracture distal clavicle.      Electronically signed by: Lyndon Patel  Date:    08/14/2024  Time:    22:51               Narrative:    EXAMINATION:  XR CLAVICLE RIGHT    CLINICAL HISTORY:  fracture;    TECHNIQUE:  Two-view    FINDINGS:  There is chronic healed fracture of the midportion of the right clavicle.  There is acute fracture of the distal clavicle.  There is no  separation of acromioclavicular joint.                                       CTA Neck (Final result)  Result time 08/14/24 21:44:51      Final result by Lyndon Patel MD (08/14/24 21:44:51)                   Impression:      No hemodynamically significant stenosis or arterial injury identified.      Electronically signed by: Lyndon Patel  Date:    08/14/2024  Time:    21:44               Narrative:    EXAMINATION:  CTA NECK    CLINICAL HISTORY:  1st rib fracture;    TECHNIQUE:  Multidetector axial images were performed of the neck before and following administration of contrast CT angiogram images were reconstructed.  Sagittal coronal images were reconstructed.  MIP and MPR images were also reconstructed.    Dose length product was 519 mGycm. Automated radiation control was utilized to minimize radiation dose.    COMPARISON:  CT chest same date    FINDINGS:  Carotid arteries are assessed in accordance with the NASCET criteria.    Visualized portion of the thoracic aorta is remarkable for calcified plaques without aneurysmal dilatation.  Proximal portion of the brachiocephalic trunk and the left common carotid artery are not well seen due to adjacent intense venous contrast.  There is mild calcified plaque of the brachiocephalic trunk without significant stenosis.  Visualized portion of subclavian arteries are patent.    There is unremarkable contrast flow within the right common carotid artery, internal and external carotid arteries without flow-limiting stenosis, spasm, dissection, aneurysm prominence or intraluminal thrombus.  The proximal portion of the left common carotid artery is not well seen due to artifacts.  Otherwise, the left common carotid artery, internal and the external carotid arteries are without hemodynamically significant stenosis, dissection, intraluminal thrombus or aneurysmal prominence.    Flow is seen bilaterally within vertebral arteries without dissection or aneurysm.    Chest findings  as described previously.                                       CT 3D Rendering WO Independent Workstation (In process)                      CT Chest Abdomen Pelvis With IV Contrast (XPD) Routine (Final result)  Result time 08/14/24 20:24:38      Final result by Misael Ledesma MD (08/14/24 20:24:38)                   Impression:      Multiple right rib fractures with very small right pneumothorax.    Fractures of the proximal and distal portions of the right clavicle.      Electronically signed by: Misael Ledesma  Date:    08/14/2024  Time:    20:24               Narrative:    EXAMINATION:  CT CHEST ABDOMEN PELVIS WITH IV CONTRAST (XPD)    CLINICAL HISTORY:  Trauma;    TECHNIQUE:  CT imaging of the chest, abdomen and pelvis after IV contrast. Axial, coronal and sagittal reformatted images reviewed. Dose length product is 1333 mGycm. Automatic exposure control, adjustment of mA/kV or iterative reconstruction technique used to limit radiation dose.    COMPARISON:  No relevant comparison studies available at the time of dictation.    FINDINGS:  No mediastinal hematoma or significant pericardial fluid.  Very small right pneumothorax.  Mild atelectasis bilaterally.    No defined hepatic or splenic laceration.  Previous cholecystectomy.  Normal pancreas and adrenal glands.  Areas of right renal cortical scarring.  No traumatic renal findings.  Normal bladder.  Small hiatal hernia.  No mesenteric hematoma, pneumoperitoneum or ascites.  Left iliac venous stent.    Fractures of the posterior right 1st through 7th ribs.  The right 4th, 5th and 6th rib fractures are displaced and overlapped.  Minimally displaced fractures of the lateral right 4th through 7th ribs.  Minimally displaced fracture at the right 1st costochondral junction.  Minimally displaced medial right clavicle fracture.  Displaced fracture of the distal right clavicle.                                       CT Cervical Spine Without Contrast (Final result)   Result time 08/14/24 20:01:58      Final result by Lyndon Patel MD (08/14/24 20:01:58)                   Impression:      No acute fracture or malalignment identified.      Electronically signed by: Lyndon Patel  Date:    08/14/2024  Time:    20:01               Narrative:    EXAMINATION:  CT CERVICAL SPINE WITHOUT CONTRAST    CLINICAL HISTORY:  Trauma.    TECHNIQUE:  Multidetector axial images were performed of the cervical spine without and.  Images were reconstructed.    Automated exposure control was utilized to minimize radiation dose.  DLP 1528.    COMPARISON:  None available.    FINDINGS:  There is solid ACDF at C4, C5, C6 and C7.  There are interbody fusions at C4-C5, C5-C6 and C6-C7.  Cervical vertebrae stature is maintained and alignment is unremarkable.  No acute fracture or malalignment identified.  There are mild degenerative changes which cause some impression ventral thecal sac without significant narrowings of the neural foramen..  There is no prevertebral soft tissue prominence.    This study does not exclude the possibility of intrathecal soft tissue, ligamentous or vascular injury.                                       CT Maxillofacial Without Contrast (Final result)  Result time 08/14/24 20:01:46      Final result by Lyndon Patel MD (08/14/24 20:01:46)                   Impression:      1. Right periorbital soft tissue inflammations    2. No acute maxillofacial fracture identified      Electronically signed by: Lyndon Patel  Date:    08/14/2024  Time:    20:01               Narrative:    EXAMINATION:  CT MAXILLOFACIAL WITHOUT CONTRAST    CLINICAL HISTORY:  Facial trauma, blunt;    TECHNIQUE:  Multidetector axial images were performed maxillofacial without contrast and images reformatted.    Dose length product of 1528 mGycm. Automated exposure control was utilized to minimize radiation dose.    COMPARISON:  None available.    FINDINGS:  There are right periorbital soft tissue  inflammations.  There are no fractures of the orbital walls. The globes are unremarkable and no intra-orbital inflammations or emphysema identified. There are no fractures of the nasal bones, pterygoids, zygomatic arches, paranasal sinuses walls or the mandibles.                                       CT Head Without Contrast (Final result)  Result time 08/14/24 19:57:45      Final result by Lyndon Patel MD (08/14/24 19:57:45)                   Impression:      Left shallow subdural hematoma and small subarachnoid hemorrhage.      Electronically signed by: Lyndon Patel  Date:    08/14/2024  Time:    19:57               Narrative:    EXAMINATION:  CT HEAD WITHOUT CONTRAST    CLINICAL HISTORY:  Trauma;    TECHNIQUE:  Sequential axial images were performed of the brain without contrast.    Dose product length of 1528 mGycm. Automated exposure control was utilized to minimize radiation dose.    COMPARISON:  None available.    FINDINGS:  There is left cerebral convexity shallow subdural hematoma with maximum thickness of 3.5 mm.  There is also adjacent left frontal small subarachnoid hemorrhage on image 29 series 3.  There is no mass effect, midline shift or hydrocephalus. There is no sulcal effacement or low attenuation changes to suggest recent large vessel territory infarction. Chronic appearing periventricular and subcortical white matter low attenuation changes are present and are consistent with chronic microangiopathic ischemia. The ventricular system and sulcal markings prominence is consistent with atrophy.  No acute depressed skull fracture identified.    Maxillofacial findings are described on separate report.                                       X-Ray Pelvis Routine AP (Final result)  Result time 08/14/24 20:38:01      Final result by Lyndon Patel MD (08/14/24 20:38:01)                   Impression:      No acute osseous abnormality identified.      Electronically signed by: Lyndon  Amanda  Date:    08/14/2024  Time:    20:38               Narrative:    EXAMINATION:  Pelvis XR PELVIS ROUTINE AP    CLINICAL HISTORY:  Trauma.    TECHNIQUE:  One view    COMPARISON:  None available.    FINDINGS:  Bilateral iliac crests were excluded on the image.  There is left iliac stent graft.  Demineralization of the bones.  As visualized, no acute fracture or dislocation identified                                       X-Ray Chest 1 View (Final result)  Result time 08/14/24 22:31:06      Final result by Lyndon Patel MD (08/14/24 22:31:06)                   Impression:      As above.  Please see CT chest report for details.      Electronically signed by: Lyndon Patel  Date:    08/14/2024  Time:    22:31               Narrative:    EXAMINATION:  XR CHEST 1 VIEW    CLINICAL HISTORY:  r/o bleeding or hemorrhage;    TECHNIQUE:  Two views    COMPARISON:  CT chest same date..    FINDINGS:  Cardiopericardial silhouette is mildly enlarged.  There are multiple fractures of the right ribs.  Right lung patchy opacities suggest contusions.  CT chest was also remarkable for small right pneumothorax which is not readily apparent on the chest radiograph.  Old appearing deformity of the right clavicle.                                       Medications   fentaNYL (SUBLIMAZE) 50 mcg/mL injection (has no administration in time range)   ondansetron 4 mg/2 mL injection (has no administration in time range)   niCARdipine 40 mg/200 mL (0.2 mg/mL) infusion (7.5 mg/hr Intravenous Rate/Dose Change 8/14/24 2113)   0.9%  NaCl infusion ( Intravenous New Bag 8/14/24 2038)   acetaminophen tablet 650 mg (650 mg Oral Given 8/14/24 2114)   oxyCODONE immediate release tablet 5 mg (has no administration in time range)   morphine injection 2 mg (2 mg Intravenous Given 8/14/24 2215)   methocarbamoL tablet 500 mg (500 mg Oral Given 8/14/24 2114)   levETIRAcetam tablet 500 mg (500 mg Oral Given 8/14/24 2036)   famotidine tablet 20 mg (20 mg Oral  Given 8/14/24 2043)   melatonin tablet 6 mg (has no administration in time range)   polyethylene glycol packet 17 g (17 g Oral Given 8/14/24 2036)   docusate sodium capsule 100 mg (100 mg Oral Given 8/14/24 2036)   bisacodyL suppository 10 mg (has no administration in time range)   hydrALAZINE injection 10 mg (has no administration in time range)   mupirocin 2 % ointment (has no administration in time range)   0.9%  NaCl infusion (0 mL/hr Intravenous Stopped 8/14/24 2037)   ondansetron injection (4 mg Intravenous Given 8/14/24 1910)   fentaNYL 50 mcg/mL injection (50 mcg Intravenous Given 8/14/24 1910)   iohexoL (OMNIPAQUE 350) injection 100 mL (100 mLs Intravenous Given 8/14/24 1940)   sodium chloride 0.9% bolus 1,000 mL 1,000 mL (1,000 mLs Intravenous Bolus from Bag 8/14/24 2049)   iohexoL (OMNIPAQUE 350) injection 60 mL (100 mLs Intravenous Given 8/14/24 2117)     Medical Decision Making  The differential diagnosis includes, but is not limited to, intracranial hemorrhage, spinal fracture, closed head injury, rib fractures, pneumothorax     ABC's intact  CT scans with subdural and subarachnoid, right-sided rib fractures, pneumothorax and clavicle fracture  Neurosurgery consulted and recommends repeat CT head in the morning and keep blood pressure below 160  Admitted to trauma ICU    Problems Addressed:  Closed fracture of multiple ribs of right side, initial encounter: acute illness or injury that poses a threat to life or bodily functions  Periorbital hematoma of right eye: acute illness or injury that poses a threat to life or bodily functions  Pneumothorax, right: acute illness or injury that poses a threat to life or bodily functions  Traumatic subarachnoid hemorrhage with loss of consciousness of 30 minutes or less, initial encounter: acute illness or injury that poses a threat to life or bodily functions  Traumatic subdural hematoma with loss of consciousness of 30 minutes or less, initial encounter: acute  illness or injury that poses a threat to life or bodily functions    Amount and/or Complexity of Data Reviewed  Labs: ordered. Decision-making details documented in ED Course.  Radiology: ordered and independent interpretation performed. Decision-making details documented in ED Course.    Risk  Prescription drug management.  Drug therapy requiring intensive monitoring for toxicity.  Decision regarding hospitalization.            Scribe Attestation:   Scribe #1: I performed the above scribed service and the documentation accurately describes the services I performed. I attest to the accuracy of the note.    Attending Attestation:           Physician Attestation for Scribe:  Physician Attestation Statement for Scribe #1: I, Celeste Thomas, DO, reviewed documentation, as scribed by Opal Murphy in my presence, and it is both accurate and complete.             ED Course as of 08/15/24 0045   Wed Aug 14, 2024   1958 Spoke with radiology- small subdural with SAH. Patient hypertensive, will start Cleviprex  [KM]   2002 NSGY consult: Dr veras recs SBP < 160, repeat CT in AM  [KM]      ED Course User Index  [KM] Celeste Thomas MD                           Clinical Impression:  Final diagnoses:  [S06.5X1A] Traumatic subdural hematoma with loss of consciousness of 30 minutes or less, initial encounter (Primary)  [S06.6X1A] Traumatic subarachnoid hemorrhage with loss of consciousness of 30 minutes or less, initial encounter  [H05.231] Periorbital hematoma of right eye  [J93.9] Pneumothorax, right  [S22.41XA] Closed fracture of multiple ribs of right side, initial encounter          ED Disposition Condition    Admit Fair                Celeste Thomas MD  08/15/24 0045     07-Jan-2020 19:50

## 2024-08-16 LAB
ALBUMIN SERPL-MCNC: 2.8 G/DL (ref 3.4–4.8)
ALBUMIN/GLOB SERPL: 1 RATIO (ref 1.1–2)
ALP SERPL-CCNC: 73 UNIT/L (ref 40–150)
ALT SERPL-CCNC: 20 UNIT/L (ref 0–55)
ANION GAP SERPL CALC-SCNC: 10 MEQ/L
AST SERPL-CCNC: 22 UNIT/L (ref 5–34)
BASOPHILS # BLD AUTO: 0.01 X10(3)/MCL
BASOPHILS NFR BLD AUTO: 0.2 %
BILIRUB SERPL-MCNC: 0.2 MG/DL
BUN SERPL-MCNC: 16.5 MG/DL (ref 9.8–20.1)
CALCIUM SERPL-MCNC: 8.1 MG/DL (ref 8.4–10.2)
CHLORIDE SERPL-SCNC: 108 MMOL/L (ref 98–107)
CO2 SERPL-SCNC: 21 MMOL/L (ref 23–31)
CREAT SERPL-MCNC: 0.83 MG/DL (ref 0.55–1.02)
CREAT/UREA NIT SERPL: 20
EOSINOPHIL # BLD AUTO: 0.02 X10(3)/MCL (ref 0–0.9)
EOSINOPHIL NFR BLD AUTO: 0.3 %
ERYTHROCYTE [DISTWIDTH] IN BLOOD BY AUTOMATED COUNT: 13.7 % (ref 11.5–17)
GFR SERPLBLD CREATININE-BSD FMLA CKD-EPI: >60 ML/MIN/1.73/M2
GLOBULIN SER-MCNC: 2.8 GM/DL (ref 2.4–3.5)
GLUCOSE SERPL-MCNC: 130 MG/DL (ref 82–115)
HCT VFR BLD AUTO: 29.5 % (ref 37–47)
HGB BLD-MCNC: 9.2 G/DL (ref 12–16)
IMM GRANULOCYTES # BLD AUTO: 0.05 X10(3)/MCL (ref 0–0.04)
IMM GRANULOCYTES NFR BLD AUTO: 0.8 %
LYMPHOCYTES # BLD AUTO: 1.32 X10(3)/MCL (ref 0.6–4.6)
LYMPHOCYTES NFR BLD AUTO: 20 %
MCH RBC QN AUTO: 28.5 PG (ref 27–31)
MCHC RBC AUTO-ENTMCNC: 31.2 G/DL (ref 33–36)
MCV RBC AUTO: 91.3 FL (ref 80–94)
MONOCYTES # BLD AUTO: 0.53 X10(3)/MCL (ref 0.1–1.3)
MONOCYTES NFR BLD AUTO: 8 %
NEUTROPHILS # BLD AUTO: 4.66 X10(3)/MCL (ref 2.1–9.2)
NEUTROPHILS NFR BLD AUTO: 70.7 %
NRBC BLD AUTO-RTO: 0 %
PLATELET # BLD AUTO: 252 X10(3)/MCL (ref 130–400)
PMV BLD AUTO: 9.6 FL (ref 7.4–10.4)
POCT GLUCOSE: 116 MG/DL (ref 70–110)
POTASSIUM SERPL-SCNC: 4.4 MMOL/L (ref 3.5–5.1)
PROT SERPL-MCNC: 5.6 GM/DL (ref 5.8–7.6)
RBC # BLD AUTO: 3.23 X10(6)/MCL (ref 4.2–5.4)
SODIUM SERPL-SCNC: 139 MMOL/L (ref 136–145)
WBC # BLD AUTO: 6.59 X10(3)/MCL (ref 4.5–11.5)

## 2024-08-16 PROCEDURE — 94664 DEMO&/EVAL PT USE INHALER: CPT

## 2024-08-16 PROCEDURE — 94640 AIRWAY INHALATION TREATMENT: CPT

## 2024-08-16 PROCEDURE — 63600175 PHARM REV CODE 636 W HCPCS: Performed by: NURSE PRACTITIONER

## 2024-08-16 PROCEDURE — 27000221 HC OXYGEN, UP TO 24 HOURS

## 2024-08-16 PROCEDURE — 11000001 HC ACUTE MED/SURG PRIVATE ROOM

## 2024-08-16 PROCEDURE — 97535 SELF CARE MNGMENT TRAINING: CPT

## 2024-08-16 PROCEDURE — 25000003 PHARM REV CODE 250: Performed by: SURGERY

## 2024-08-16 PROCEDURE — 94761 N-INVAS EAR/PLS OXIMETRY MLT: CPT

## 2024-08-16 PROCEDURE — 27000646 HC AEROBIKA DEVICE

## 2024-08-16 PROCEDURE — 99900035 HC TECH TIME PER 15 MIN (STAT)

## 2024-08-16 PROCEDURE — 99900031 HC PATIENT EDUCATION (STAT)

## 2024-08-16 PROCEDURE — 85025 COMPLETE CBC W/AUTO DIFF WBC: CPT | Performed by: NURSE PRACTITIONER

## 2024-08-16 PROCEDURE — 80053 COMPREHEN METABOLIC PANEL: CPT | Performed by: NURSE PRACTITIONER

## 2024-08-16 PROCEDURE — 36415 COLL VENOUS BLD VENIPUNCTURE: CPT | Performed by: NURSE PRACTITIONER

## 2024-08-16 PROCEDURE — 25000003 PHARM REV CODE 250: Performed by: NURSE PRACTITIONER

## 2024-08-16 PROCEDURE — 97530 THERAPEUTIC ACTIVITIES: CPT | Mod: CQ

## 2024-08-16 PROCEDURE — 94760 N-INVAS EAR/PLS OXIMETRY 1: CPT

## 2024-08-16 PROCEDURE — 99233 SBSQ HOSP IP/OBS HIGH 50: CPT | Mod: ,,, | Performed by: SURGERY

## 2024-08-16 PROCEDURE — 25000242 PHARM REV CODE 250 ALT 637 W/ HCPCS: Performed by: SURGERY

## 2024-08-16 PROCEDURE — 25000003 PHARM REV CODE 250

## 2024-08-16 RX ORDER — HYDROCODONE BITARTRATE AND ACETAMINOPHEN 5; 325 MG/1; MG/1
1 TABLET ORAL EVERY 4 HOURS PRN
COMMUNITY

## 2024-08-16 RX ORDER — METFORMIN HYDROCHLORIDE 750 MG/1
750 TABLET, EXTENDED RELEASE ORAL 2 TIMES DAILY WITH MEALS
COMMUNITY

## 2024-08-16 RX ORDER — PRAMIPEXOLE DIHYDROCHLORIDE 1.5 MG/1
2 TABLET ORAL DAILY
COMMUNITY

## 2024-08-16 RX ORDER — VALSARTAN 80 MG/1
320 TABLET ORAL DAILY
Status: DISCONTINUED | OUTPATIENT
Start: 2024-08-16 | End: 2024-08-19 | Stop reason: HOSPADM

## 2024-08-16 RX ORDER — PRAMIPEXOLE DIHYDROCHLORIDE 1 MG/1
3 TABLET ORAL DAILY
Status: DISCONTINUED | OUTPATIENT
Start: 2024-08-16 | End: 2024-08-19 | Stop reason: HOSPADM

## 2024-08-16 RX ORDER — VALSARTAN 320 MG/1
320 TABLET ORAL DAILY
COMMUNITY

## 2024-08-16 RX ORDER — FLUOXETINE HYDROCHLORIDE 40 MG/1
40 CAPSULE ORAL DAILY
COMMUNITY

## 2024-08-16 RX ORDER — NABUMETONE 500 MG/1
500 TABLET, FILM COATED ORAL 2 TIMES DAILY PRN
COMMUNITY

## 2024-08-16 RX ORDER — CLOPIDOGREL BISULFATE 75 MG/1
75 TABLET ORAL DAILY
Status: ON HOLD | COMMUNITY
End: 2024-08-19

## 2024-08-16 RX ORDER — LOVASTATIN 40 MG/1
40 TABLET ORAL NIGHTLY
COMMUNITY

## 2024-08-16 RX ORDER — PREGABALIN 150 MG/1
150 CAPSULE ORAL NIGHTLY
COMMUNITY

## 2024-08-16 RX ORDER — AMANTADINE HYDROCHLORIDE 50 MG/5ML
100 SOLUTION ORAL 2 TIMES DAILY
Status: DISCONTINUED | OUTPATIENT
Start: 2024-08-16 | End: 2024-08-16

## 2024-08-16 RX ORDER — LEVOTHYROXINE SODIUM 137 UG/1
1 TABLET ORAL
COMMUNITY

## 2024-08-16 RX ORDER — PREGABALIN 75 MG/1
150 CAPSULE ORAL NIGHTLY
Status: DISCONTINUED | OUTPATIENT
Start: 2024-08-16 | End: 2024-08-19 | Stop reason: HOSPADM

## 2024-08-16 RX ORDER — ENOXAPARIN SODIUM 100 MG/ML
40 INJECTION SUBCUTANEOUS EVERY 12 HOURS
Status: DISCONTINUED | OUTPATIENT
Start: 2024-08-17 | End: 2024-08-19 | Stop reason: HOSPADM

## 2024-08-16 RX ORDER — PANTOPRAZOLE SODIUM 40 MG/1
40 TABLET, DELAYED RELEASE ORAL DAILY
Status: DISCONTINUED | OUTPATIENT
Start: 2024-08-16 | End: 2024-08-19 | Stop reason: HOSPADM

## 2024-08-16 RX ORDER — FLUOXETINE HYDROCHLORIDE 20 MG/1
40 CAPSULE ORAL DAILY
Status: DISCONTINUED | OUTPATIENT
Start: 2024-08-16 | End: 2024-08-19 | Stop reason: HOSPADM

## 2024-08-16 RX ORDER — AZELASTINE 1 MG/ML
1 SPRAY, METERED NASAL 2 TIMES DAILY
COMMUNITY

## 2024-08-16 RX ORDER — PANTOPRAZOLE SODIUM 40 MG/1
40 TABLET, DELAYED RELEASE ORAL DAILY
COMMUNITY

## 2024-08-16 RX ADMIN — OXYCODONE HYDROCHLORIDE 5 MG: 5 TABLET ORAL at 02:08

## 2024-08-16 RX ADMIN — IPRATROPIUM BROMIDE AND ALBUTEROL SULFATE 3 ML: .5; 3 SOLUTION RESPIRATORY (INHALATION) at 11:08

## 2024-08-16 RX ADMIN — OXYCODONE HYDROCHLORIDE 5 MG: 5 TABLET ORAL at 10:08

## 2024-08-16 RX ADMIN — WHITE PETROLATUM: 1.75 OINTMENT TOPICAL at 02:08

## 2024-08-16 RX ADMIN — DOCUSATE SODIUM 100 MG: 100 CAPSULE, LIQUID FILLED ORAL at 09:08

## 2024-08-16 RX ADMIN — Medication: at 02:08

## 2024-08-16 RX ADMIN — ACETAMINOPHEN 325MG 650 MG: 325 TABLET ORAL at 06:08

## 2024-08-16 RX ADMIN — IPRATROPIUM BROMIDE AND ALBUTEROL SULFATE 3 ML: .5; 3 SOLUTION RESPIRATORY (INHALATION) at 08:08

## 2024-08-16 RX ADMIN — METHOCARBAMOL 500 MG: 500 TABLET ORAL at 02:08

## 2024-08-16 RX ADMIN — LEVETIRACETAM 500 MG: 500 TABLET, FILM COATED ORAL at 09:08

## 2024-08-16 RX ADMIN — IPRATROPIUM BROMIDE AND ALBUTEROL SULFATE 3 ML: .5; 3 SOLUTION RESPIRATORY (INHALATION) at 04:08

## 2024-08-16 RX ADMIN — PRAMIPEXOLE DIHYDROCHLORIDE 3 MG: 1 TABLET ORAL at 02:08

## 2024-08-16 RX ADMIN — HYDRALAZINE HYDROCHLORIDE 10 MG: 20 INJECTION INTRAMUSCULAR; INTRAVENOUS at 05:08

## 2024-08-16 RX ADMIN — FLUOXETINE HYDROCHLORIDE 40 MG: 20 CAPSULE ORAL at 02:08

## 2024-08-16 RX ADMIN — MUPIROCIN: 20 OINTMENT TOPICAL at 09:08

## 2024-08-16 RX ADMIN — ACETAMINOPHEN 325MG 650 MG: 325 TABLET ORAL at 10:08

## 2024-08-16 RX ADMIN — POLYETHYLENE GLYCOL 3350 17 G: 17 POWDER, FOR SOLUTION ORAL at 09:08

## 2024-08-16 RX ADMIN — VALSARTAN 320 MG: 80 TABLET, FILM COATED ORAL at 02:08

## 2024-08-16 RX ADMIN — PREGABALIN 150 MG: 75 CAPSULE ORAL at 09:08

## 2024-08-16 RX ADMIN — METHOCARBAMOL 500 MG: 500 TABLET ORAL at 06:08

## 2024-08-16 RX ADMIN — PANTOPRAZOLE SODIUM 40 MG: 40 TABLET, DELAYED RELEASE ORAL at 02:08

## 2024-08-16 RX ADMIN — METHOCARBAMOL 500 MG: 500 TABLET ORAL at 10:08

## 2024-08-16 RX ADMIN — ACETAMINOPHEN 325MG 650 MG: 325 TABLET ORAL at 02:08

## 2024-08-16 RX ADMIN — IPRATROPIUM BROMIDE AND ALBUTEROL SULFATE 3 ML: .5; 3 SOLUTION RESPIRATORY (INHALATION) at 12:08

## 2024-08-16 RX ADMIN — Medication 6 MG: at 09:08

## 2024-08-16 RX ADMIN — MORPHINE SULFATE 2 MG: 4 INJECTION, SOLUTION INTRAMUSCULAR; INTRAVENOUS at 02:08

## 2024-08-16 NOTE — PT/OT/SLP PROGRESS
"Physical Therapy Treatment    Patient Name:  Cata Gasca   MRN:  [id number]    Recommendations:     Discharge therapy intensity: Moderate Intensity Therapy   Discharge Equipment Recommendations: to be determined by next level of care  Barriers to discharge: Decreased caregiver support and Ongoing medical needs    Assessment:     Cata Gasca is a 84 y.o. female admitted with a medical diagnosis of  fall from porch with R clavicle fx (non-op), SAH, SDH, multiple R sided rib fxs, pneumothorax.  She presents with the following impairments/functional limitations: weakness, impaired functional mobility, impaired cardiopulmonary response to activity, impaired endurance, gait instability, impaired balance, decreased lower extremity function, orthopedic precautions.    Patient limited 2/2 pain and increased BP.     Rehab Prognosis: Good; patient would benefit from acute skilled PT services to address these deficits and reach maximum level of function.    Recent Surgery: * No surgery found *      Plan:     During this hospitalization, patient would benefit from acute PT services 5 x/week to address the identified rehab impairments via gait training, therapeutic activities, therapeutic exercises and progress toward the following goals:    Plan of Care Expires:  09/21/24    Subjective     Chief Complaint: "under my wing hurts"  Patient/Family Comments/goals: none stated  Pain/Comfort:  Pain Rating 1: 10/10      Objective:     Communicated with nurse prior to session.  Patient found HOB elevated with peripheral IV, oxygen, blood pressure cuff, pulse ox (continuous), telemetry, pressure relief boots upon PT entry to room.     General Precautions: Standard, fall (<160/90)  Orthopedic Precautions: RUE weight bearing as tolerated (RUE no OH ROM)  Braces: Cervical collar (sling for comfort, c-collar until cleared)  Respiratory Status: Room air  Blood Pressure: 182/73- nurse present  Skin Integrity: Visible skin intact      Functional " Mobility:  Bed Mobility:     Rolling Left:  total assistance for tang care  Rolling Right: total assistance for tang care     Education:  Patient provided with verbal education education regarding PT role/goals/POC, fall prevention, and safety awareness.  Understanding was verbalized.     Patient left HOB elevated with all lines intact and call button in reach    GOALS:   Multidisciplinary Problems       Physical Therapy Goals          Problem: Physical Therapy    Goal Priority Disciplines Outcome Goal Variances Interventions   Physical Therapy Goal     PT, PT/OT Progressing     Description: Goals to be met by: d/c     Patient will increase functional independence with mobility by performin. Supine to sit with Stand-by Assistance  2. Sit to supine with Stand-by Assistance  3. Sit to stand transfer with Stand-by Assistance  4. Bed to chair transfer with Stand-by Assistance using Least Restrictive Assistive Device  5. Gait  x 50 feet with Supervision using Least Restrictive Assistive Device.                          Time Tracking:     PT Received On: 24  PT Start Time: 1427     PT Stop Time: 1450  PT Total Time (min): 23 min     Billable Minutes: Therapeutic Activity 23    Treatment Type: Treatment  PT/PTA: PTA     Number of PTA visits since last PT visit: 2024

## 2024-08-16 NOTE — PLAN OF CARE
Patient made aware and will complete Updated [facility] that we were working on skilled level care for this pt

## 2024-08-16 NOTE — PROGRESS NOTES
[facility] ICU PROGRESS NOTE    HD# 2  Admission Summary:   Patient is an approximately 81 year old female who fell off her porch approximately 2 feet per EMS, states + LOC. Pt laid on the ground for an unknown amount of time attempting to draw attention to herself, comes in wet from water hose. C/O right shoulder pain, no deformity appreciated, abrasion to right posterior upper arm. Right periorbital bruising with subconjunctival hemorrhage. C/O tenderness to thoracic area upon palpitation. Bilateral lower legs swollen from what appears to be venous stasis, dressing to left lower leg.     Interval history:    NAEON. Doing well    Consults:   Neurosurgery  Orthopedic surgery Injuries:  SAH  Sdh  Multiple Right rib fractures  Right clavicle fracture  PTX    [x]Problems list reviewed Operations/Procedures:  none     Past medical history:  DMII  Restless leg syndrome  Diabetic neuropathy  PVD  HTN  HLD    Medications: [x] Medications reviewed/updated   Home Meds:  No current outpatient medications   Scheduled Meds:    acetaminophen  650 mg Oral Q4H    albuterol-ipratropium  3 mL Nebulization Q4H    amantadine HCL  100 mg Oral BID    docusate sodium  100 mg Oral BID    levETIRAcetam  500 mg Oral BID    LIDOcaine  2 patch Transdermal Q24H    methocarbamoL  500 mg Oral Q8H    mupirocin   Nasal BID    polyethylene glycol  17 g Oral BID     Continuous Infusions:   PRN Meds:   Current Facility-Administered Medications:     bisacodyL, 10 mg, Rectal, Daily PRN    dextrose 10%, 12.5 g, Intravenous, PRN    dextrose 10%, 25 g, Intravenous, PRN    glucagon (human recombinant), 1 mg, Intramuscular, PRN    glucose, 16 g, Oral, PRN    glucose, 24 g, Oral, PRN    hydrALAZINE, 10 mg, Intravenous, Q6H PRN    insulin aspart U-100, 0-10 Units, Subcutaneous, QID (AC + HS) PRN    melatonin, 6 mg, Oral, Nightly PRN    oxyCODONE, 5 mg, Oral, Q4H PRN     Vitals:  VITAL SIGNS: 24 HR MIN & MAX LAST   Temp  Min: 98 °F (36.7 °C)  Max: 98.8 °F (37.1  "°C)  98.2 °F (36.8 °C)   BP  Min: 106/42  Max: 146/61  (!) 121/52    Pulse  Min: 72  Max: 95  78    Resp  Min: 13  Max: 22  18    SpO2  Min: 92 %  Max: 99 %  99 %      HT: 5' 5" (165.1 cm)  WT: 90.7 kg (199 lb 15.3 oz)  BMI: 33.3               General  Exam: NAD     Neuro/Psych  GCS: 14 (E 4) (V 4) (M 6)  Exam: Patient is mildly confused  ICP monitor: No  ICP treatment: ICP Treatment: N/A  C-Collar: Yes    Plan:   SDH/SAH- Keppra x7 days, Bp<160, PT/OT     HEENT  Exam: right eye bruising     Plan:   monitor     Pulmonary  Vitals: Resp  Av.2  Min: 13  Max: 22  SpO2  Av.3 %  Min: 92 %  Max: 99 %    Ventilator/Oxygen Settings:              ABG:   No results for input(s): "PH", "PO2", "PCO2", "HCO3", "BE" in the last 168 hours.     CXR:    No results found in the last 24 hours.      Rib fractures: right  Chest Tube: None     Exam: coarse Breathe sounds bilaterally    Plan:     Rib fractures- Pain control IS/Flutter/nebs  Incentive Spirometry/RT Treatments: none     Cardiovascular  Vitals: Pulse  Av.2  Min: 72  Max: 95  BP  Min: 106/42  Max: 146/61  No results for input(s): "TROPONINI", "CKTOTAL", "CKMB", "BNP" in the last 168 hours.  Vasoactive Agents: None  Exam: RRR    Plan:   monitor     Renal  Recent Labs     24  1914 08/15/24  0440 24  0233   BUN 30.0* 21.0* 16.5   CREATININE 1.36* 1.00 0.83       No results for input(s): "LACTIC" in the last 72 hours.    Intake/Output - Last 3 Shifts          0700  08/15 0659 08/15 07 0659  07 0659    P.O.  720     I.V. (mL/kg) 1959 (21.6) 11.9 (0.1)     IV Piggyback 1000      Total Intake(mL/kg) 2959 (32.6) 731.9 (8.1)     Urine (mL/kg/hr) 400 450 (0.2)     Total Output 400 450     Net +2559 +281.9            Urine Occurrence 2 x 2 x              Intake/Output Summary (Last 24 hours) at 2024 0815  Last data filed at 2024 0202  Gross per 24 hour   Intake 731.91 ml   Output 450 ml   Net 281.91 ml         Ford: No "     Plan:   monitor     FEN/GI  Recent Labs     08/14/24  1914 08/15/24  0440 24  0233    139 139   K 4.6 4.9 4.4   * 109* 108*   CO2 21* 20* 21*   CALCIUM 9.9 8.5 8.1*   ALBUMIN 3.7 3.1* 2.8*   BILITOT 0.3 0.4 0.2   AST 26 34 22   ALKPHOS 85 78 73   ALT 18 26 20       Diet: Diabetic diet    Last BM: none    Abdominal Exam: S/NT/ND +BS    Plan:   Bowel regimen     Heme/Onc  Recent Labs     08/14/24  1914 08/15/24  0440 08/16/24  0233   HGB 12.1 10.8* 9.2*   HCT 37.0 33.6* 29.5*    278 252   INR 1.0  --   --        Transfusions (over past 24h): None    Plan:   monitor     ID  Temp  Av.3 °F (36.8 °C)  Min: 98 °F (36.7 °C)  Max: 98.8 °F (37.1 °C)      Recent Labs     08/14/24  1914 08/15/24  0440 24  0233   WBC 14.29* 8.68 6.59       Cultures: Antibiotics:    none 1. none     Plan:   monitor     Endocrine  Recent Labs     08/14/24  1914 08/15/24  0440 08/16/24  0233   GLUCOSE 107 131* 130*      Recent Labs     08/15/24  2015   POCTGLUCOSE 117*        Plan:   DMII- on moderate sliding scale  Insulin treatment: as above     Musculoskeletal/Wounds  Weight bearing status:   RUE: WBAT  LUE: WBAT  RLE: WBAT  LLE: WBAT    [x] Tertiary exam performed    Extremity/wound exam: right eye bruise  Plan:   monitor     Precautions  Pressure ulcer prevention, Delirium, Respiratory, and Standard     Prophylaxis  Seizure: Keppra (day 2/7)  DVT: Holding lovenox   GI: H2B     Lines/drains/airway [x] LDA reviewed/updated   Lines/Drains/Airways       Drain  Duration             Female External Urinary Catheter w/ Suction 240 1 day              Peripheral Intravenous Line  Duration                  Peripheral IV - Single Lumen 24 20 G Left Antecubital 1 day         Peripheral IV - Single Lumen 24 20 G Right Antecubital 1 day                    Plan:  monitor     Restraints  Face to face evaluation of need for restraints on rounds today:   Currently restrained? No.         Disposition  Stable to transfer to floor to trauma surgery  SDH/SAH- Keppra x7 days, Bp<160, PT/OT  Rib fractures- Pain control IS/Flutter/nebs  Right clavicle fx- non-op PT/OT  DMII- moderate sliding scale   Lovenox to start tomorrow     Mars Mora Jr, MD MS  Trauma Critical Care Surgery

## 2024-08-16 NOTE — NURSING
[facility] [address] ICU  Wound Care    Patient Name:  Cata Gasca   MRN:  [id number]  Date: 8/16/2024  Diagnosis: SDH (subdural hematoma)    History:     Past Medical History:   Diagnosis Date    Diabetes mellitus        Social History     Socioeconomic History    Marital status:      Social Determinants of Health     Financial Resource Strain: Patient Unable To Answer (8/16/2024)    Overall Financial Resource Strain (CARDIA)     Difficulty of Paying Living Expenses: Patient unable to answer   Food Insecurity: No Food Insecurity (8/16/2024)    Hunger Vital Sign     Worried About Running Out of Food in the Last Year: Never true     Ran Out of Food in the Last Year: Never true   Transportation Needs: No Transportation Needs (8/16/2024)    TRANSPORTATION NEEDS     Transportation : No   Physical Activity: Inactive (8/16/2024)    Exercise Vital Sign     Days of Exercise per Week: 0 days     Minutes of Exercise per Session: 0 min   Housing Stability: Low Risk  (8/16/2024)    Housing Stability Vital Sign     Unable to Pay for Housing in the Last Year: No     Homeless in the Last Year: No       Precautions:     Allergies as of 08/14/2024    (No Known Allergies)       WOC Assessment Details/Treatment   Consult received for left lower leg venous ulcer. Patient awake, confused, states she has a wound care nurse come to her house and changes dressings on her legs every week. Noted hemosiderin staining to bilateral lower leg, slight edema. No open wounds noted, does have areas of dry, flakey brown skin.       08/16/24 0950   WOCN Assessment   WOCN Total Time (mins) 45   Visit Date 08/16/24   Visit Time 0950   Consult Type New   WOCN Speciality Wound   Wound venous   Number of Wounds 0   Intervention assessed;changed;applied;chart review;orders   Skin Interventions   Device Skin Pressure Protection absorbent pad utilized/changed   Pressure Reduction Techniques frequent weight shift encouraged;heels  elevated off bed   Positioning   Body Position 30 degrees   Head of Bed (HOB) Positioning HOB at 30 degrees   Positioning/Transfer Devices pillows;wedge        Wound 08/14/24 2200 Venous Ulcer Left lateral;lower Leg   Date First Assessed/Time First Assessed: 08/14/24 2200   Present on Original Admission: Yes  Primary Wound Type: Venous Ulcer  Side: Left  Orientation: lateral;lower  Location: Leg  Is this injury device related?: No   Wound Image     Dressing Appearance Intact   Drainage Amount None   Appearance Intact;Epithelialization   Tissue loss description Not applicable   Periwound Area Intact;Edematous   Wound Length (cm) 0 cm  (no open wounds noted)   Wound Width (cm) 0 cm   Wound Depth (cm) 0 cm   Wound Volume (cm^3) 0 cm^3   Wound Surface Area (cm^2) 0 cm^2         Recommendations made to primary team for skin care with hibiclens wash and aquaphor ointment to lower legs. Orders placed. Reconsult if needed.     08/16/2024

## 2024-08-16 NOTE — PT/OT/SLP PROGRESS
Physical Therapy      Patient Name:  Cata Gasca   MRN:  [id number]    Patient not seen today secondary to patient refusal. Patient given max encouragement to participate however patient still declined and stated she is only willing to work with therapy once her daughter gets here. Will follow-up as schedule permits.

## 2024-08-16 NOTE — PLAN OF CARE
[facility] sent choice list to oscar Francois [facility] for [facility] to [facility], awaiting choices.

## 2024-08-16 NOTE — NURSING
After dispensing, preparing, and scanning medication in room, patient refusing to take medication. Educated patient on each medication, why it is important to take it, and why they were prescribed. Patient still refusing to take medication. Wasted all meds in pyxis. MD notified. Care ongoing.

## 2024-08-16 NOTE — PLAN OF CARE
Dr updates me that pt has dementia and is not making sense, we spoke with daughter Priscila Estes on the phone [phone], she is driving in. She updates us that she recently had placed pt at [facility] in hopes of getting her medication straight. Pt called her and she picked pt up from the nursing home the same day that she arrived.  Pt has been home with [facility]. Priscila tells us they had contacted her PCP Dr Dean Castro in [place] for medications to help with behaviors at home but has not had a response.   Dr Mora discussed her meds,  that the meds for depression dont address the dementia. He is starting her on dementia meds.  Discussed with Priscila that if they cannot provide 24/7 care at home pt will need to stay at the nursing home. There is no POA in place. Priscila states she has a soft heart and that's why she went to get her at [facility]. We discussed that she should not do that this time unless she can provide 24/7 care at home because of her dementia.   Priscila mentions that her mom had said she would prefer a nursing home in [place] and would like to start there.   [facility] notified of above and asked to send list to Hanny phone. She knows to pick her top 4 in order of preference. [facility] will go ahead and send to [facility] locations as this was mentioned as a preference.   Pt has medicare only. Therapy is trying to work with pt but she was not participated this am. Grand daughter came to try to calm her down and allow therapy to work with her.

## 2024-08-16 NOTE — PROGRESS NOTES
Patient is current with [facility] HH: [phone].

## 2024-08-16 NOTE — NURSING
Nurses Note -- 4 Eyes      8/15/2024   10:23 PM      Skin assessed during: Q Shift Change      [] No Altered Skin Integrity Present    [x]Prevention Measures Documented      [x] Yes- Altered Skin Integrity Present or Discovered   [x] LDA Added if Not in Epic (Describe Wound)   [x] New Altered Skin Integrity was Present on Admit and Documented in LDA   [x] Wound Image Taken    Wound Care Consulted? No    Attending Nurse:  Pete MILLS    Second RN/Staff Member:   Fariba MILLS

## 2024-08-16 NOTE — PT/OT/SLP PROGRESS
Occupational Therapy   Treatment    Name: Cata Gasca  MRN: [id number]  Admitting Diagnosis:  Fall      Recommendations:     Recommended therapy intensity at discharge: Moderate Intensity Therapy   Discharge Equipment Recommendations:  to be determined by next level of care  Barriers to discharge:  Other (Comment) (ongoing medical needs)    Assessment:     Cata Gasca is a 84 y.o. female with a medical diagnosis of fall from porch with R clavicle fx (non-op), SAH, SDH, multiple R sided rib fxs, pneumothorax. Performance deficits affecting function are weakness, impaired endurance, impaired self care skills, impaired functional mobility, gait instability, impaired balance, decreased lower extremity function, decreased upper extremity function, decreased safety awareness, impaired cognition. She required total A for bed mobility and was limited by significant pain this date. Continue to recommend moderate intensity therapy upon d/c.     Rehab Prognosis:  Good; patient would benefit from acute skilled OT services to address these deficits and reach maximum level of function.       Plan:     Patient to be seen 5 x/week to address the above listed problems via self-care/home management, therapeutic activities, therapeutic exercises  Plan of Care Expires: 09/15/24  Plan of Care Reviewed with: patient    Subjective     Pain/Comfort:  Pain Rating 1: 10/10  Location 1: rib(s)  Pain Addressed 1: Pre-medicate for activity, Reposition, Nurse notified    Objective:     Communicated with: RN prior to session.  Patient found HOB elevated with peripheral IV, pulse ox (continuous), telemetry, blood pressure cuff, PureWick, pressure relief boots upon OT entry to room.    General Precautions: Standard, fall (<160/90)    Orthopedic Precautions:RUE weight bearing as tolerated (ok for use with ADL's and RW, no overhead ROM)  Braces: Cervical collar (sling for comfort, c-collar until cleared)  Respiratory Status: Room air  Vital Signs: Blood  Pressure: 182/73 after activity; RN present      Occupational Performance:     Bed Mobility:    Patient completed Rolling/Turning to Left with  total assistance  Patient completed Rolling/Turning to Right with total assistance     Functional Mobility/Transfers:  Functional Mobility: will progress mobility as able; limited by significant pain    Activities of Daily Living:  Toileting: total assistance for perineal hygiene while sidelying L/R.     Therapeutic Positioning    OT interventions performed during the course of today's session in an effort to prevent and/or reduce acquired pressure injuries:   Therapeutic positioning was provided at the conclusion of session to offload all bony prominences for the prevention and/or reduction of pressure injuries    Skin assessment: all bony prominences were assessed    Findings: known area of altered skin integrity at bruising on R eye.     [facility] 6 Click ADL: 12    Patient Education:  Patient provided with verbal education education regarding OT role/goals/POC, fall prevention, safety awareness, Discharge/DME recommendations, and pressure ulcer prevention.  Understanding was verbalized.      Patient left HOB elevated with all lines intact, call button in reach, pressure relief boots, and RN notified.    GOALS:   Multidisciplinary Problems       Occupational Therapy Goals          Problem: Occupational Therapy    Goal Priority Disciplines Outcome Interventions   Occupational Therapy Goal     OT, PT/OT Progressing    Description: LTG: Pt will perform basic ADLs and ADL transfers with Modified independence using LRAD by discharge.    STG: to be met by 9/15/24    Pt will complete grooming standing at sink with LRAD with SBA.  Pt will complete UB dressing with SBA.  Pt will complete LB dressing with SBA using LRAD.  Pt will complete toileting with SBA using LRAD.  Pt will complete functional mobility to/from toilet and toilet transfer with SBA using LRAD.                         Time Tracking:     OT Date of Treatment: 08/16/24  OT Start Time: 1431  OT Stop Time: 1451  OT Total Time (min): 20 min    Billable Minutes:Self Care/Home Management 20 minutes.     OT/FÉLIX: OT     Number of FÉLIX visits since last OT visit: 1 8/16/2024

## 2024-08-17 LAB
ALBUMIN SERPL-MCNC: 2.7 G/DL (ref 3.4–4.8)
ALBUMIN/GLOB SERPL: 1 RATIO (ref 1.1–2)
ALP SERPL-CCNC: 81 UNIT/L (ref 40–150)
ALT SERPL-CCNC: 17 UNIT/L (ref 0–55)
ANION GAP SERPL CALC-SCNC: 9 MEQ/L
AST SERPL-CCNC: 20 UNIT/L (ref 5–34)
BACTERIA UR CULT: ABNORMAL
BASOPHILS # BLD AUTO: 0.01 X10(3)/MCL
BASOPHILS NFR BLD AUTO: 0.1 %
BILIRUB SERPL-MCNC: 0.5 MG/DL
BUN SERPL-MCNC: 11.2 MG/DL (ref 9.8–20.1)
CALCIUM SERPL-MCNC: 8.2 MG/DL (ref 8.4–10.2)
CHLORIDE SERPL-SCNC: 108 MMOL/L (ref 98–107)
CO2 SERPL-SCNC: 21 MMOL/L (ref 23–31)
CREAT SERPL-MCNC: 0.78 MG/DL (ref 0.55–1.02)
CREAT/UREA NIT SERPL: 14
EOSINOPHIL # BLD AUTO: 0.04 X10(3)/MCL (ref 0–0.9)
EOSINOPHIL NFR BLD AUTO: 0.6 %
ERYTHROCYTE [DISTWIDTH] IN BLOOD BY AUTOMATED COUNT: 13.6 % (ref 11.5–17)
GFR SERPLBLD CREATININE-BSD FMLA CKD-EPI: >60 ML/MIN/1.73/M2
GLOBULIN SER-MCNC: 2.8 GM/DL (ref 2.4–3.5)
GLUCOSE SERPL-MCNC: 128 MG/DL (ref 82–115)
HCT VFR BLD AUTO: 28.2 % (ref 37–47)
HGB BLD-MCNC: 9 G/DL (ref 12–16)
IMM GRANULOCYTES # BLD AUTO: 0.05 X10(3)/MCL (ref 0–0.04)
IMM GRANULOCYTES NFR BLD AUTO: 0.7 %
LYMPHOCYTES # BLD AUTO: 0.85 X10(3)/MCL (ref 0.6–4.6)
LYMPHOCYTES NFR BLD AUTO: 12.6 %
MCH RBC QN AUTO: 28.5 PG (ref 27–31)
MCHC RBC AUTO-ENTMCNC: 31.9 G/DL (ref 33–36)
MCV RBC AUTO: 89.2 FL (ref 80–94)
MONOCYTES # BLD AUTO: 0.46 X10(3)/MCL (ref 0.1–1.3)
MONOCYTES NFR BLD AUTO: 6.8 %
NEUTROPHILS # BLD AUTO: 5.36 X10(3)/MCL (ref 2.1–9.2)
NEUTROPHILS NFR BLD AUTO: 79.2 %
NRBC BLD AUTO-RTO: 0 %
PLATELET # BLD AUTO: 246 X10(3)/MCL (ref 130–400)
PMV BLD AUTO: 9.4 FL (ref 7.4–10.4)
POCT GLUCOSE: 110 MG/DL (ref 70–110)
POCT GLUCOSE: 121 MG/DL (ref 70–110)
POCT GLUCOSE: 122 MG/DL (ref 70–110)
POCT GLUCOSE: 134 MG/DL (ref 70–110)
POCT GLUCOSE: 135 MG/DL (ref 70–110)
POTASSIUM SERPL-SCNC: 4.2 MMOL/L (ref 3.5–5.1)
PROT SERPL-MCNC: 5.5 GM/DL (ref 5.8–7.6)
RBC # BLD AUTO: 3.16 X10(6)/MCL (ref 4.2–5.4)
SODIUM SERPL-SCNC: 138 MMOL/L (ref 136–145)
WBC # BLD AUTO: 6.77 X10(3)/MCL (ref 4.5–11.5)

## 2024-08-17 PROCEDURE — 99900035 HC TECH TIME PER 15 MIN (STAT)

## 2024-08-17 PROCEDURE — 25000003 PHARM REV CODE 250

## 2024-08-17 PROCEDURE — 85025 COMPLETE CBC W/AUTO DIFF WBC: CPT | Performed by: NURSE PRACTITIONER

## 2024-08-17 PROCEDURE — 27000646 HC AEROBIKA DEVICE

## 2024-08-17 PROCEDURE — 63600175 PHARM REV CODE 636 W HCPCS: Performed by: NURSE PRACTITIONER

## 2024-08-17 PROCEDURE — 94640 AIRWAY INHALATION TREATMENT: CPT

## 2024-08-17 PROCEDURE — 99233 SBSQ HOSP IP/OBS HIGH 50: CPT | Mod: ,,, | Performed by: SURGERY

## 2024-08-17 PROCEDURE — 11000001 HC ACUTE MED/SURG PRIVATE ROOM

## 2024-08-17 PROCEDURE — 63600175 PHARM REV CODE 636 W HCPCS: Performed by: SURGERY

## 2024-08-17 PROCEDURE — 36415 COLL VENOUS BLD VENIPUNCTURE: CPT | Performed by: NURSE PRACTITIONER

## 2024-08-17 PROCEDURE — 94760 N-INVAS EAR/PLS OXIMETRY 1: CPT

## 2024-08-17 PROCEDURE — 94664 DEMO&/EVAL PT USE INHALER: CPT

## 2024-08-17 PROCEDURE — 99900031 HC PATIENT EDUCATION (STAT)

## 2024-08-17 PROCEDURE — 27000221 HC OXYGEN, UP TO 24 HOURS

## 2024-08-17 PROCEDURE — 80053 COMPREHEN METABOLIC PANEL: CPT | Performed by: NURSE PRACTITIONER

## 2024-08-17 PROCEDURE — 63600175 PHARM REV CODE 636 W HCPCS

## 2024-08-17 PROCEDURE — 25000003 PHARM REV CODE 250: Performed by: SURGERY

## 2024-08-17 PROCEDURE — 25000242 PHARM REV CODE 250 ALT 637 W/ HCPCS: Performed by: SURGERY

## 2024-08-17 PROCEDURE — 94761 N-INVAS EAR/PLS OXIMETRY MLT: CPT

## 2024-08-17 PROCEDURE — 25000003 PHARM REV CODE 250: Performed by: NURSE PRACTITIONER

## 2024-08-17 RX ORDER — ACETAMINOPHEN 10 MG/ML
1000 INJECTION, SOLUTION INTRAVENOUS ONCE
Status: COMPLETED | OUTPATIENT
Start: 2024-08-17 | End: 2024-08-17

## 2024-08-17 RX ORDER — OXYCODONE HYDROCHLORIDE 5 MG/1
5 TABLET ORAL ONCE
Status: DISCONTINUED | OUTPATIENT
Start: 2024-08-17 | End: 2024-08-17

## 2024-08-17 RX ADMIN — OXYCODONE HYDROCHLORIDE 5 MG: 5 TABLET ORAL at 08:08

## 2024-08-17 RX ADMIN — IPRATROPIUM BROMIDE AND ALBUTEROL SULFATE 3 ML: .5; 3 SOLUTION RESPIRATORY (INHALATION) at 07:08

## 2024-08-17 RX ADMIN — HYDRALAZINE HYDROCHLORIDE 10 MG: 20 INJECTION INTRAMUSCULAR; INTRAVENOUS at 02:08

## 2024-08-17 RX ADMIN — PREGABALIN 150 MG: 75 CAPSULE ORAL at 08:08

## 2024-08-17 RX ADMIN — POLYETHYLENE GLYCOL 3350 17 G: 17 POWDER, FOR SOLUTION ORAL at 09:08

## 2024-08-17 RX ADMIN — OXYCODONE HYDROCHLORIDE 5 MG: 5 TABLET ORAL at 02:08

## 2024-08-17 RX ADMIN — METHOCARBAMOL 500 MG: 500 TABLET ORAL at 02:08

## 2024-08-17 RX ADMIN — METHOCARBAMOL 500 MG: 500 TABLET ORAL at 09:08

## 2024-08-17 RX ADMIN — DOCUSATE SODIUM 100 MG: 100 CAPSULE, LIQUID FILLED ORAL at 09:08

## 2024-08-17 RX ADMIN — ACETAMINOPHEN 1000 MG: 10 INJECTION, SOLUTION INTRAVENOUS at 05:08

## 2024-08-17 RX ADMIN — MUPIROCIN: 20 OINTMENT TOPICAL at 09:08

## 2024-08-17 RX ADMIN — IPRATROPIUM BROMIDE AND ALBUTEROL SULFATE 3 ML: .5; 3 SOLUTION RESPIRATORY (INHALATION) at 03:08

## 2024-08-17 RX ADMIN — LEVOTHYROXINE SODIUM 137 MCG: 25 TABLET ORAL at 06:08

## 2024-08-17 RX ADMIN — PANTOPRAZOLE SODIUM 40 MG: 40 TABLET, DELAYED RELEASE ORAL at 09:08

## 2024-08-17 RX ADMIN — OXYCODONE HYDROCHLORIDE 5 MG: 5 TABLET ORAL at 05:08

## 2024-08-17 RX ADMIN — ENOXAPARIN SODIUM 40 MG: 40 INJECTION SUBCUTANEOUS at 08:08

## 2024-08-17 RX ADMIN — IPRATROPIUM BROMIDE AND ALBUTEROL SULFATE 3 ML: .5; 3 SOLUTION RESPIRATORY (INHALATION) at 08:08

## 2024-08-17 RX ADMIN — ACETAMINOPHEN 325MG 650 MG: 325 TABLET ORAL at 06:08

## 2024-08-17 RX ADMIN — IPRATROPIUM BROMIDE AND ALBUTEROL SULFATE 3 ML: .5; 3 SOLUTION RESPIRATORY (INHALATION) at 12:08

## 2024-08-17 RX ADMIN — ENOXAPARIN SODIUM 40 MG: 40 INJECTION SUBCUTANEOUS at 09:08

## 2024-08-17 RX ADMIN — HYDRALAZINE HYDROCHLORIDE 10 MG: 20 INJECTION INTRAMUSCULAR; INTRAVENOUS at 12:08

## 2024-08-17 RX ADMIN — METHOCARBAMOL 500 MG: 500 TABLET ORAL at 06:08

## 2024-08-17 RX ADMIN — Medication 6 MG: at 08:08

## 2024-08-17 RX ADMIN — LEVETIRACETAM 500 MG: 500 TABLET, FILM COATED ORAL at 09:08

## 2024-08-17 RX ADMIN — OXYCODONE HYDROCHLORIDE 5 MG: 5 TABLET ORAL at 12:08

## 2024-08-17 RX ADMIN — FLUOXETINE HYDROCHLORIDE 40 MG: 20 CAPSULE ORAL at 09:08

## 2024-08-17 RX ADMIN — DOCUSATE SODIUM 100 MG: 100 CAPSULE, LIQUID FILLED ORAL at 08:08

## 2024-08-17 RX ADMIN — PRAMIPEXOLE DIHYDROCHLORIDE 3 MG: 1 TABLET ORAL at 11:08

## 2024-08-17 RX ADMIN — OXYCODONE HYDROCHLORIDE 5 MG: 5 TABLET ORAL at 04:08

## 2024-08-17 RX ADMIN — MUPIROCIN: 20 OINTMENT TOPICAL at 08:08

## 2024-08-17 RX ADMIN — ACETAMINOPHEN 325MG 650 MG: 325 TABLET ORAL at 02:08

## 2024-08-17 RX ADMIN — WHITE PETROLATUM: 1.75 OINTMENT TOPICAL at 09:08

## 2024-08-17 RX ADMIN — POLYETHYLENE GLYCOL 3350 17 G: 17 POWDER, FOR SOLUTION ORAL at 08:08

## 2024-08-17 RX ADMIN — IPRATROPIUM BROMIDE AND ALBUTEROL SULFATE 3 ML: .5; 3 SOLUTION RESPIRATORY (INHALATION) at 11:08

## 2024-08-17 RX ADMIN — LEVETIRACETAM 500 MG: 500 TABLET, FILM COATED ORAL at 08:08

## 2024-08-17 RX ADMIN — VALSARTAN 320 MG: 80 TABLET, FILM COATED ORAL at 09:08

## 2024-08-17 RX ADMIN — LIDOCAINE 2 PATCH: 700 PATCH TOPICAL at 11:08

## 2024-08-17 NOTE — PLAN OF CARE
Problem: Adult Inpatient Plan of Care  Goal: Plan of Care Review  Outcome: Progressing  Goal: Absence of Hospital-Acquired Illness or Injury  Outcome: Progressing  Goal: Optimal Comfort and Wellbeing  Outcome: Progressing     Problem: Skin Injury Risk Increased  Goal: Skin Health and Integrity  Outcome: Progressing     Problem: Fall Injury Risk  Goal: Absence of Fall and Fall-Related Injury  Outcome: Progressing     Problem: Wound  Goal: Optimal Coping  Outcome: Progressing  Goal: Improved Oral Intake  Outcome: Progressing  Goal: Skin Health and Integrity  Outcome: Progressing

## 2024-08-17 NOTE — NURSING
Nurses Note -- 4 Eyes      8/17/2024   12:25 AM      Skin assessed during: Q Shift Change      [] No Altered Skin Integrity Present    [x]Prevention Measures Documented      [x] Yes- Altered Skin Integrity Present or Discovered   [x] LDA Added if Not in Epic (Describe Wound)   [x] New Altered Skin Integrity was Present on Admit and Documented in LDA   [x] Wound Image Taken    Wound Care Consulted? Yes    Attending Nurse:  Pete MILLS    Second RN/Staff Member:   Fariba MILLS

## 2024-08-17 NOTE — PROGRESS NOTES
Trauma Surgery   Progress Note  Admit Date: 8/14/2024  HD#3  POD#* No surgery found *    Subjective:   Interval history:  Afebrile, VSS on NC 3L. Patient resting, easily awakens. Reports mild itching to chest wall.     Home Meds:   Current Outpatient Medications   Medication Instructions    azelastine (ASTELIN) 137 mcg (0.1 %) nasal spray 1 spray, Nasal, 2 times daily    clopidogreL (PLAVIX) 75 mg, Oral, Daily    FLUoxetine 40 mg, Oral, Daily    HYDROcodone-acetaminophen (NORCO) 5-325 mg per tablet 1 tablet, Oral, Every 4 hours PRN    levothyroxine (SYNTHROID) 137 MCG Tab tablet 1 tablet, Oral, Before breakfast    lovastatin (MEVACOR) 40 mg, Oral, Nightly    metFORMIN (GLUCOPHAGE-XR) 750 mg, Oral, 2 times daily with meals    nabumetone (RELAFEN) 500 mg, Oral, 2 times daily PRN    pantoprazole (PROTONIX) 40 mg, Oral, Daily    pramipexole (MIRAPEX) 1.5 MG tablet 2 tablets, Oral, Daily    pregabalin (LYRICA) 150 mg, Oral, Nightly    valsartan (DIOVAN) 320 mg, Oral, Daily      Scheduled Meds:   acetaminophen  650 mg Oral Q4H    albuterol-ipratropium  3 mL Nebulization Q4H    docusate sodium  100 mg Oral BID    enoxparin  40 mg Subcutaneous Q12H (prophylaxis, 0900/2100)    FLUoxetine  40 mg Oral Daily    levETIRAcetam  500 mg Oral BID    levothyroxine  137 mcg Oral Before breakfast    LIDOcaine  2 patch Transdermal Q24H    methocarbamoL  500 mg Oral Q8H    mupirocin   Nasal BID    pantoprazole  40 mg Oral Daily    polyethylene glycol  17 g Oral BID    pramipexole  3 mg Oral Daily    pregabalin  150 mg Oral QHS    valsartan  320 mg Oral Daily    white petrolatum   Topical (Top) Daily     Continuous Infusions:  PRN Meds:  Current Facility-Administered Medications:     bisacodyL, 10 mg, Rectal, Daily PRN    dextrose 10%, 12.5 g, Intravenous, PRN    dextrose 10%, 25 g, Intravenous, PRN    diphenhydrAMINE-zinc acetate 1-0.1%, , Topical (Top), TID PRN    glucagon (human recombinant), 1 mg, Intramuscular, PRN    glucose, 16  "g, Oral, PRN    glucose, 24 g, Oral, PRN    hydrALAZINE, 10 mg, Intravenous, Q6H PRN    insulin aspart U-100, 0-10 Units, Subcutaneous, QID (AC + HS) PRN    melatonin, 6 mg, Oral, Nightly PRN    oxyCODONE, 5 mg, Oral, Q4H PRN     Objective:     VITAL SIGNS: 24 HR MIN & MAX LAST   Temp  Min: 98 °F (36.7 °C)  Max: 98.4 °F (36.9 °C)  98.1 °F (36.7 °C)   BP  Min: 99/43  Max: 175/73  (!) 172/75    Pulse  Min: 77  Max: 106  88    Resp  Min: 15  Max: 25  17    SpO2  Min: 91 %  Max: 100 %  95 %      HT: 5' 5" (165.1 cm)  WT: 90.7 kg (199 lb 15.3 oz)  BMI: 33.3     Intake/output:  Intake/Output - Last 3 Shifts         08/15 0700 08/16 0659 08/16 0700 08/17 0659 08/17 0700 08/18 0659    P.O. 720 720     I.V. (mL/kg) 11.9 (0.1)      IV Piggyback       Total Intake(mL/kg) 731.9 (8.1) 720 (7.9)     Urine (mL/kg/hr) 450 (0.2) 500 (0.2)     Total Output 450 500     Net +281.9 +220            Urine Occurrence 2 x              Intake/Output Summary (Last 24 hours) at 8/17/2024 1116  Last data filed at 8/17/2024 0400  Gross per 24 hour   Intake 720 ml   Output 500 ml   Net 220 ml         Lines/drains/airway:       Peripheral IV - Single Lumen 08/14/24 1910 20 G Left Antecubital (Active)   Site Assessment Clean;Intact;Dry 08/17/24 0400   Extremity Assessment Distal to IV No abnormal discoloration;No redness;No swelling;No warmth 08/16/24 0731   Line Status Infusing 08/17/24 0400   Dressing Status Intact;Clean;Dry 08/17/24 0400   Dressing Intervention Integrity maintained 08/17/24 0400   Number of days: 2            Peripheral IV - Single Lumen 08/14/24 2020 20 G Right Antecubital (Active)   Site Assessment Clean;Dry;Intact 08/17/24 0400   Extremity Assessment Distal to IV No abnormal discoloration;No redness;No swelling;No warmth 08/16/24 0731   Line Status Infusing 08/17/24 0400   Dressing Status Clean;Dry;Intact 08/17/24 0400   Dressing Intervention Integrity maintained 08/17/24 0400   Number of days: 2       Female External " "Urinary Catheter w/ Suction 08/14/24 2200 (Active)   Skin no redness;no breakdown 08/16/24 0731   Tolerance no signs/symptoms of discomfort 08/16/24 0731   Suction Continuous suction at 70 mmHg 08/16/24 0731   Output (mL) 500 mL 08/16/24 2000   Number of days: 2       Physical examination:  Gen: NAD, AAOx3, answering questions appropriately  HEENT: Normocephalic, bruising to right periorbital region, c-collar in place   CV: RR  Resp: NWOB on NC  Abd: S/NT/ND  Msk: moving all extremities spontaneously and purposefully, pain with movement of RUE  Neuro: CN II-XII grossly intact  Skin/wounds: warm, dry    Labs:  Renal:  Recent Labs     08/14/24  1914 08/15/24  0440 08/16/24  0233 08/17/24  0408   BUN 30.0* 21.0* 16.5 11.2   CREATININE 1.36* 1.00 0.83 0.78     No results for input(s): "LACTIC" in the last 72 hours.  FEN/GI:  Recent Labs     08/14/24  1914 08/15/24  0440 08/16/24  0233 08/17/24  0408    139 139 138   K 4.6 4.9 4.4 4.2   * 109* 108* 108*   CO2 21* 20* 21* 21*   CALCIUM 9.9 8.5 8.1* 8.2*   ALBUMIN 3.7 3.1* 2.8* 2.7*   BILITOT 0.3 0.4 0.2 0.5   AST 26 34 22 20   ALKPHOS 85 78 73 81   ALT 18 26 20 17     Heme:  Recent Labs     08/14/24  1914 08/15/24  0440 08/16/24  0233 08/17/24  0409   HGB 12.1 10.8* 9.2* 9.0*   HCT 37.0 33.6* 29.5* 28.2*    278 252 246   INR 1.0  --   --   --      ID:  Recent Labs     08/14/24  1914 08/15/24  0440 08/16/24  0233 08/17/24  0409   WBC 14.29* 8.68 6.59 6.77     CBG:  Recent Labs     08/14/24 1914 08/15/24  0440 08/16/24  0233 08/17/24  0408   GLUCOSE 107 131* 130* 128*      Recent Labs     08/15/24  2015 08/16/24  1402 08/16/24  2101 08/17/24  0632   POCTGLUCOSE 117* 116* 110 121*      Cardiovascular:  No results for input(s): "TROPONINI", "CKTOTAL", "CKMB", "BNP" in the last 168 hours.  I have reviewed all pertinent lab results within the past 24 hours.    Imaging:  CT Head Without Contrast   Final Result      Stable areas of intracranial hemorrhage as " outlined above         Electronically signed by: Hao Zheng   Date:    08/15/2024   Time:    10:59      CT Head Without Contrast   Final Result      1. Increased small right cerebral convexity subdural hemorrhage.   2. Similar trace left cerebral convexity subdural and subarachnoid hemorrhages.         Electronically signed by: Saranya Smith   Date:    08/15/2024   Time:    08:15      X-Ray Chest 1 View   Final Result      Improved aeration at the right lung.         Electronically signed by: Renee Oliver   Date:    08/15/2024   Time:    06:19      X-Ray Clavicle Right   Final Result      Acute fracture distal clavicle.         Electronically signed by: Lyndon Patel   Date:    08/14/2024   Time:    22:51      CTA Neck   Final Result      No hemodynamically significant stenosis or arterial injury identified.         Electronically signed by: Lyndon Patel   Date:    08/14/2024   Time:    21:44      CT 3D Rendering WO Independent Workstation   Final Result      3D osseous reconstructions.         Electronically signed by: Misael Ledesma   Date:    08/15/2024   Time:    08:38      CT Chest Abdomen Pelvis With IV Contrast (XPD) Routine   Final Result      Multiple right rib fractures with very small right pneumothorax.      Fractures of the proximal and distal portions of the right clavicle.         Electronically signed by: Misael Ledesma   Date:    08/14/2024   Time:    20:24      CT Cervical Spine Without Contrast   Final Result      No acute fracture or malalignment identified.         Electronically signed by: Lyndon Patel   Date:    08/14/2024   Time:    20:01      CT Maxillofacial Without Contrast   Final Result      1. Right periorbital soft tissue inflammations      2. No acute maxillofacial fracture identified         Electronically signed by: Lyndon Patel   Date:    08/14/2024   Time:    20:01      CT Head Without Contrast   Final Result      Left shallow subdural hematoma and small subarachnoid  hemorrhage.         Electronically signed by: Lyndon Patel   Date:    08/14/2024   Time:    19:57      X-Ray Pelvis Routine AP   Final Result      No acute osseous abnormality identified.         Electronically signed by: Lyndon Patel   Date:    08/14/2024   Time:    20:38      X-Ray Chest 1 View   Final Result      As above.  Please see CT chest report for details.         Electronically signed by: Lyndon Patel   Date:    08/14/2024   Time:    22:31         I have reviewed all pertinent imaging results/findings within the past 24 hours.    Micro/Path/Other:  Microbiology Results (last 7 days)       Procedure Component Value Units Date/Time    Urine culture [[id number]]  (Abnormal)  (Susceptibility) Collected: 08/14/24 2041    Order Status: Completed Specimen: Urine Updated: 08/17/24 0639     Urine Culture >/= 100,000 colonies/ml Klebsiella pneumoniae ssp pneumoniae           Pathology Results  (Last 7 days)      None             Problems list:  Active Problem List with Overview Notes    Diagnosis Date Noted    SDH (subdural hematoma) 08/14/2024    SAH (subarachnoid hemorrhage) 08/14/2024    Closed fracture of multiple ribs of right side with routine healing 08/14/2024    Closed fracture of right clavicle with routine healing 08/14/2024    Hypertension 08/14/2024    Pneumothorax 08/14/2024        Assessment & Plan:     SAH, SDH  - Evaluated by Neurosurgery  - Keppra x 7 days  - Maintain BP < 160/90  - C-collar cleared, no midline cervical tenderness on exam, negative c-spine imaging     Multiple right rib fractures, PTX  - MMPC  - Frequent IS, flutter and nebs  - Good pulmonary hygiene  - CXR 8/15 without pneumothorax    Right clavicle fracture  - Evaluated by Orthopedics  - No surgical intervention needed    Fall from height  - Cardiac diabetic diet  - Daily labs  - MMPC  - PT/OT  - Fall precautions  - Lovenox for VTE prophylaxis    DM  - SSI  - POC glucose AC & HS    Mandie Marroquin, AGACNP-BC, FNP-BC  Trauma  Surgery  [facility]  C: [phone]

## 2024-08-18 LAB
ALBUMIN SERPL-MCNC: 2.6 G/DL (ref 3.4–4.8)
ALBUMIN/GLOB SERPL: 0.7 RATIO (ref 1.1–2)
ALP SERPL-CCNC: 96 UNIT/L (ref 40–150)
ALT SERPL-CCNC: 25 UNIT/L (ref 0–55)
ANION GAP SERPL CALC-SCNC: 10 MEQ/L
AST SERPL-CCNC: 23 UNIT/L (ref 5–34)
BASOPHILS # BLD AUTO: 0.01 X10(3)/MCL
BASOPHILS NFR BLD AUTO: 0.2 %
BILIRUB SERPL-MCNC: 0.5 MG/DL
BUN SERPL-MCNC: 12.9 MG/DL (ref 9.8–20.1)
CALCIUM SERPL-MCNC: 9 MG/DL (ref 8.4–10.2)
CHLORIDE SERPL-SCNC: 105 MMOL/L (ref 98–107)
CO2 SERPL-SCNC: 22 MMOL/L (ref 23–31)
CREAT SERPL-MCNC: 0.76 MG/DL (ref 0.55–1.02)
CREAT/UREA NIT SERPL: 17
EOSINOPHIL # BLD AUTO: 0.06 X10(3)/MCL (ref 0–0.9)
EOSINOPHIL NFR BLD AUTO: 1.1 %
ERYTHROCYTE [DISTWIDTH] IN BLOOD BY AUTOMATED COUNT: 13.8 % (ref 11.5–17)
GFR SERPLBLD CREATININE-BSD FMLA CKD-EPI: >60 ML/MIN/1.73/M2
GLOBULIN SER-MCNC: 3.6 GM/DL (ref 2.4–3.5)
GLUCOSE SERPL-MCNC: 113 MG/DL (ref 82–115)
HCT VFR BLD AUTO: 30.3 % (ref 37–47)
HGB BLD-MCNC: 9.8 G/DL (ref 12–16)
IMM GRANULOCYTES # BLD AUTO: 0.06 X10(3)/MCL (ref 0–0.04)
IMM GRANULOCYTES NFR BLD AUTO: 1.1 %
LYMPHOCYTES # BLD AUTO: 1.15 X10(3)/MCL (ref 0.6–4.6)
LYMPHOCYTES NFR BLD AUTO: 20.3 %
MCH RBC QN AUTO: 28.5 PG (ref 27–31)
MCHC RBC AUTO-ENTMCNC: 32.3 G/DL (ref 33–36)
MCV RBC AUTO: 88.1 FL (ref 80–94)
MONOCYTES # BLD AUTO: 0.38 X10(3)/MCL (ref 0.1–1.3)
MONOCYTES NFR BLD AUTO: 6.7 %
NEUTROPHILS # BLD AUTO: 4.01 X10(3)/MCL (ref 2.1–9.2)
NEUTROPHILS NFR BLD AUTO: 70.6 %
NRBC BLD AUTO-RTO: 0 %
PLATELET # BLD AUTO: 285 X10(3)/MCL (ref 130–400)
PMV BLD AUTO: 9.8 FL (ref 7.4–10.4)
POCT GLUCOSE: 121 MG/DL (ref 70–110)
POCT GLUCOSE: 135 MG/DL (ref 70–110)
POCT GLUCOSE: 137 MG/DL (ref 70–110)
POTASSIUM SERPL-SCNC: 3.6 MMOL/L (ref 3.5–5.1)
PROT SERPL-MCNC: 6.2 GM/DL (ref 5.8–7.6)
RBC # BLD AUTO: 3.44 X10(6)/MCL (ref 4.2–5.4)
SODIUM SERPL-SCNC: 137 MMOL/L (ref 136–145)
WBC # BLD AUTO: 5.67 X10(3)/MCL (ref 4.5–11.5)

## 2024-08-18 PROCEDURE — 11000001 HC ACUTE MED/SURG PRIVATE ROOM

## 2024-08-18 PROCEDURE — 25000003 PHARM REV CODE 250: Performed by: SURGERY

## 2024-08-18 PROCEDURE — 80053 COMPREHEN METABOLIC PANEL: CPT | Performed by: NURSE PRACTITIONER

## 2024-08-18 PROCEDURE — 27000221 HC OXYGEN, UP TO 24 HOURS

## 2024-08-18 PROCEDURE — 99900031 HC PATIENT EDUCATION (STAT)

## 2024-08-18 PROCEDURE — 27000646 HC AEROBIKA DEVICE

## 2024-08-18 PROCEDURE — 99233 SBSQ HOSP IP/OBS HIGH 50: CPT | Mod: ,,, | Performed by: SURGERY

## 2024-08-18 PROCEDURE — 97530 THERAPEUTIC ACTIVITIES: CPT | Mod: CQ

## 2024-08-18 PROCEDURE — 63600175 PHARM REV CODE 636 W HCPCS: Performed by: SURGERY

## 2024-08-18 PROCEDURE — 25000003 PHARM REV CODE 250: Performed by: NURSE PRACTITIONER

## 2024-08-18 PROCEDURE — 94760 N-INVAS EAR/PLS OXIMETRY 1: CPT

## 2024-08-18 PROCEDURE — 85025 COMPLETE CBC W/AUTO DIFF WBC: CPT | Performed by: NURSE PRACTITIONER

## 2024-08-18 PROCEDURE — 94640 AIRWAY INHALATION TREATMENT: CPT

## 2024-08-18 PROCEDURE — 25000003 PHARM REV CODE 250

## 2024-08-18 PROCEDURE — 36415 COLL VENOUS BLD VENIPUNCTURE: CPT | Performed by: NURSE PRACTITIONER

## 2024-08-18 PROCEDURE — 94664 DEMO&/EVAL PT USE INHALER: CPT

## 2024-08-18 PROCEDURE — 99900035 HC TECH TIME PER 15 MIN (STAT)

## 2024-08-18 PROCEDURE — 94761 N-INVAS EAR/PLS OXIMETRY MLT: CPT

## 2024-08-18 PROCEDURE — 63600175 PHARM REV CODE 636 W HCPCS: Performed by: NURSE PRACTITIONER

## 2024-08-18 PROCEDURE — 25000242 PHARM REV CODE 250 ALT 637 W/ HCPCS: Performed by: SURGERY

## 2024-08-18 RX ORDER — MICONAZOLE NITRATE 2 G/100G
POWDER TOPICAL 2 TIMES DAILY
Status: DISCONTINUED | OUTPATIENT
Start: 2024-08-18 | End: 2024-08-19 | Stop reason: HOSPADM

## 2024-08-18 RX ADMIN — PREGABALIN 150 MG: 75 CAPSULE ORAL at 08:08

## 2024-08-18 RX ADMIN — POLYETHYLENE GLYCOL 3350 17 G: 17 POWDER, FOR SOLUTION ORAL at 08:08

## 2024-08-18 RX ADMIN — MUPIROCIN: 20 OINTMENT TOPICAL at 08:08

## 2024-08-18 RX ADMIN — PANTOPRAZOLE SODIUM 40 MG: 40 TABLET, DELAYED RELEASE ORAL at 08:08

## 2024-08-18 RX ADMIN — ENOXAPARIN SODIUM 40 MG: 40 INJECTION SUBCUTANEOUS at 08:08

## 2024-08-18 RX ADMIN — ACETAMINOPHEN 325MG 650 MG: 325 TABLET ORAL at 03:08

## 2024-08-18 RX ADMIN — LEVOTHYROXINE SODIUM 137 MCG: 25 TABLET ORAL at 05:08

## 2024-08-18 RX ADMIN — Medication: at 06:08

## 2024-08-18 RX ADMIN — MICONAZOLE NITRATE: 20 POWDER TOPICAL at 08:08

## 2024-08-18 RX ADMIN — DOCUSATE SODIUM 100 MG: 100 CAPSULE, LIQUID FILLED ORAL at 08:08

## 2024-08-18 RX ADMIN — MICONAZOLE NITRATE: 20 POWDER TOPICAL at 03:08

## 2024-08-18 RX ADMIN — METHOCARBAMOL 500 MG: 500 TABLET ORAL at 05:08

## 2024-08-18 RX ADMIN — OXYCODONE HYDROCHLORIDE 5 MG: 5 TABLET ORAL at 08:08

## 2024-08-18 RX ADMIN — Medication 6 MG: at 08:08

## 2024-08-18 RX ADMIN — IPRATROPIUM BROMIDE AND ALBUTEROL SULFATE 3 ML: .5; 3 SOLUTION RESPIRATORY (INHALATION) at 08:08

## 2024-08-18 RX ADMIN — HYDRALAZINE HYDROCHLORIDE 10 MG: 20 INJECTION INTRAMUSCULAR; INTRAVENOUS at 05:08

## 2024-08-18 RX ADMIN — ACETAMINOPHEN 325MG 650 MG: 325 TABLET ORAL at 05:08

## 2024-08-18 RX ADMIN — OXYCODONE HYDROCHLORIDE 5 MG: 5 TABLET ORAL at 11:08

## 2024-08-18 RX ADMIN — PRAMIPEXOLE DIHYDROCHLORIDE 3 MG: 1 TABLET ORAL at 08:08

## 2024-08-18 RX ADMIN — OXYCODONE HYDROCHLORIDE 5 MG: 5 TABLET ORAL at 05:08

## 2024-08-18 RX ADMIN — LEVETIRACETAM 500 MG: 500 TABLET, FILM COATED ORAL at 08:08

## 2024-08-18 RX ADMIN — VALSARTAN 320 MG: 80 TABLET, FILM COATED ORAL at 08:08

## 2024-08-18 RX ADMIN — ACETAMINOPHEN 325MG 650 MG: 325 TABLET ORAL at 08:08

## 2024-08-18 RX ADMIN — IPRATROPIUM BROMIDE AND ALBUTEROL SULFATE 3 ML: .5; 3 SOLUTION RESPIRATORY (INHALATION) at 04:08

## 2024-08-18 RX ADMIN — IPRATROPIUM BROMIDE AND ALBUTEROL SULFATE 3 ML: .5; 3 SOLUTION RESPIRATORY (INHALATION) at 12:08

## 2024-08-18 RX ADMIN — METHOCARBAMOL 500 MG: 500 TABLET ORAL at 03:08

## 2024-08-18 RX ADMIN — LIDOCAINE 2 PATCH: 700 PATCH TOPICAL at 08:08

## 2024-08-18 RX ADMIN — WHITE PETROLATUM: 1.75 OINTMENT TOPICAL at 08:08

## 2024-08-18 RX ADMIN — OXYCODONE HYDROCHLORIDE 5 MG: 5 TABLET ORAL at 04:08

## 2024-08-18 RX ADMIN — FLUOXETINE HYDROCHLORIDE 40 MG: 20 CAPSULE ORAL at 08:08

## 2024-08-18 NOTE — PT/OT/SLP PROGRESS
"Physical Therapy Treatment    Patient Name:  Cata Gasca   MRN:  [id number]    Recommendations:     Discharge therapy intensity: Moderate Intensity Therapy   Discharge Equipment Recommendations: to be determined by next level of care  Barriers to discharge: Decreased caregiver support and Ongoing medical needs    Assessment:     Cata Gasca is a 84 y.o. female admitted with a medical diagnosis of  fall from porch with R clavicle fx (non-op), SAH, SDH, multiple R sided rib fxs, pneumothorax.  She presents with the following impairments/functional limitations: weakness, impaired functional mobility, impaired cardiopulmonary response to activity, impaired endurance, gait instability, impaired balance, decreased lower extremity function, orthopedic precautions.    Patient presented with increased participation this session however patient refused STS. Patient reported headache during transition from sit to supine, patient appeared to be holding her breath during transition. Patient educated on pursed lip breathing BP assessed- measured 190/81mmHg, nurse notified.     Rehab Prognosis: Good; patient would benefit from acute skilled PT services to address these deficits and reach maximum level of function.    Recent Surgery: * No surgery found *      Plan:     During this hospitalization, patient would benefit from acute PT services 5 x/week to address the identified rehab impairments via gait training, therapeutic activities, therapeutic exercises and progress toward the following goals:    Plan of Care Expires:  09/21/24    Subjective     Chief Complaint: "I hurt all over honey"  Patient/Family Comments/goals: none stated  Pain/Comfort:  Pain Rating 1: 10/10 ("all over")      Objective:     Communicated with nurse prior to session.  Patient found HOB elevated with peripheral IV, oxygen, blood pressure cuff, pulse ox (continuous), telemetry, pressure relief boots upon PT entry to room.     General Precautions: Standard, fall " (<160/90)  Orthopedic Precautions: RUE weight bearing as tolerated (RUE no OH ROM)  Braces:  (c-collar cleared per Mandie Marroquin, NP)  Respiratory Status: Room air  Blood Pressure: 151/63mmHg  Skin Integrity: Visible skin intact      Functional Mobility:  Bed Mobility:     Rolling Left/right:  maxA x2  Supine <> sit: maxAx 2    Therapeutic activity:  - patient sat edge of bed with SBA for balance for ~8 minutes  - patient performed seated LAQs with bilateral upper extremity support x10     Education:  Patient provided with verbal education education regarding PT role/goals/POC, fall prevention, and safety awareness.  Understanding was verbalized.     Patient left HOB elevated with all lines intact and call button in reach    GOALS:   Multidisciplinary Problems       Physical Therapy Goals          Problem: Physical Therapy    Goal Priority Disciplines Outcome Goal Variances Interventions   Physical Therapy Goal     PT, PT/OT Progressing     Description: Goals to be met by: d/c     Patient will increase functional independence with mobility by performin. Supine to sit with Stand-by Assistance  2. Sit to supine with Stand-by Assistance  3. Sit to stand transfer with Stand-by Assistance  4. Bed to chair transfer with Stand-by Assistance using Least Restrictive Assistive Device  5. Gait  x 50 feet with Supervision using Least Restrictive Assistive Device.                          Time Tracking:     PT Received On: 24  PT Start Time: 816     PT Stop Time: 844  PT Total Time (min): 28 min     Billable Minutes: Therapeutic Activity 28    Treatment Type: Treatment  PT/PTA: PTA     Number of PTA visits since last PT visit: 2     2024

## 2024-08-18 NOTE — PROGRESS NOTES
Trauma Surgery   Progress Note  Admit Date: 8/14/2024  HD#4  POD#* No surgery found *    Subjective:   Interval history:  Afebrile, VSS on NC 2L. Patient resting, easily awakens. Awaiting SNF placement.      Home Meds:   Current Outpatient Medications   Medication Instructions    azelastine (ASTELIN) 137 mcg (0.1 %) nasal spray 1 spray, Nasal, 2 times daily    clopidogreL (PLAVIX) 75 mg, Oral, Daily    FLUoxetine 40 mg, Oral, Daily    HYDROcodone-acetaminophen (NORCO) 5-325 mg per tablet 1 tablet, Oral, Every 4 hours PRN    levothyroxine (SYNTHROID) 137 MCG Tab tablet 1 tablet, Oral, Before breakfast    lovastatin (MEVACOR) 40 mg, Oral, Nightly    metFORMIN (GLUCOPHAGE-XR) 750 mg, Oral, 2 times daily with meals    nabumetone (RELAFEN) 500 mg, Oral, 2 times daily PRN    pantoprazole (PROTONIX) 40 mg, Oral, Daily    pramipexole (MIRAPEX) 1.5 MG tablet 2 tablets, Oral, Daily    pregabalin (LYRICA) 150 mg, Oral, Nightly    valsartan (DIOVAN) 320 mg, Oral, Daily      Scheduled Meds:   acetaminophen  650 mg Oral Q4H    albuterol-ipratropium  3 mL Nebulization Q4H    docusate sodium  100 mg Oral BID    enoxparin  40 mg Subcutaneous Q12H (prophylaxis, 0900/2100)    FLUoxetine  40 mg Oral Daily    levETIRAcetam  500 mg Oral BID    levothyroxine  137 mcg Oral Before breakfast    LIDOcaine  2 patch Transdermal Q24H    methocarbamoL  500 mg Oral Q8H    miconazole NITRATE 2 %   Topical (Top) BID    mupirocin   Nasal BID    pantoprazole  40 mg Oral Daily    polyethylene glycol  17 g Oral BID    pramipexole  3 mg Oral Daily    pregabalin  150 mg Oral QHS    valsartan  320 mg Oral Daily    white petrolatum   Topical (Top) Daily     Continuous Infusions:  PRN Meds:  Current Facility-Administered Medications:     bisacodyL, 10 mg, Rectal, Daily PRN    dextrose 10%, 12.5 g, Intravenous, PRN    dextrose 10%, 25 g, Intravenous, PRN    diphenhydrAMINE-zinc acetate 1-0.1%, , Topical (Top), TID PRN    glucagon (human recombinant), 1 mg,  "Intramuscular, PRN    glucose, 16 g, Oral, PRN    glucose, 24 g, Oral, PRN    hydrALAZINE, 10 mg, Intravenous, Q6H PRN    insulin aspart U-100, 0-10 Units, Subcutaneous, QID (AC + HS) PRN    melatonin, 6 mg, Oral, Nightly PRN    oxyCODONE, 5 mg, Oral, Q4H PRN     Objective:     VITAL SIGNS: 24 HR MIN & MAX LAST   Temp  Min: 97.7 °F (36.5 °C)  Max: 98.8 °F (37.1 °C)  97.7 °F (36.5 °C)   BP  Min: 138/54  Max: 200/82  (!) 152/72    Pulse  Min: 70  Max: 108  70    Resp  Min: 12  Max: 31  20    SpO2  Min: 90 %  Max: 98 %  95 %      HT: 5' 5" (165.1 cm)  WT: 90.7 kg (199 lb 15.3 oz)  BMI: 33.3     Intake/output:  Intake/Output - Last 3 Shifts         08/16 0700 08/17 0659 08/17 0700 08/18 0659 08/18 0700 08/19 0659    P.O. 720 880 240    I.V. (mL/kg)       IV Piggyback  85.5     Total Intake(mL/kg) 720 (7.9) 965.5 (10.6) 240 (2.6)    Urine (mL/kg/hr) 500 (0.2) 1300 (0.6)     Total Output 500 1300     Net +220 -334.5 +240                   Intake/Output Summary (Last 24 hours) at 8/18/2024 1437  Last data filed at 8/18/2024 0800  Gross per 24 hour   Intake 445.46 ml   Output 500 ml   Net -54.54 ml         Lines/drains/airway:       Peripheral IV - Single Lumen 08/14/24 1910 20 G Left Antecubital (Active)   Site Assessment Clean;Intact;Dry 08/17/24 0400   Extremity Assessment Distal to IV No abnormal discoloration;No redness;No swelling;No warmth 08/16/24 0731   Line Status Infusing 08/17/24 0400   Dressing Status Intact;Clean;Dry 08/17/24 0400   Dressing Intervention Integrity maintained 08/17/24 0400   Number of days: 2            Peripheral IV - Single Lumen 08/14/24 2020 20 G Right Antecubital (Active)   Site Assessment Clean;Dry;Intact 08/17/24 0400   Extremity Assessment Distal to IV No abnormal discoloration;No redness;No swelling;No warmth 08/16/24 0731   Line Status Infusing 08/17/24 0400   Dressing Status Clean;Dry;Intact 08/17/24 0400   Dressing Intervention Integrity maintained 08/17/24 0400   Number of days: " "2       Female External Urinary Catheter w/ Suction 08/14/24 2200 (Active)   Skin no redness;no breakdown 08/16/24 0731   Tolerance no signs/symptoms of discomfort 08/16/24 0731   Suction Continuous suction at 70 mmHg 08/16/24 0731   Output (mL) 500 mL 08/16/24 2000   Number of days: 2       Physical examination:  Gen: NAD, AAOx3, answering questions appropriately  HEENT: Normocephalic, bruising to right periorbital region  CV: RR  Resp: NWOB on NC  Abd: S/NT/ND  Msk: moving all extremities spontaneously and purposefully, pain with movement of RUE  Neuro: CN II-XII grossly intact  Skin/wounds: warm, dry    Labs:  Renal:  Recent Labs     08/16/24 0233 08/17/24 0408 08/18/24 0319   BUN 16.5 11.2 12.9   CREATININE 0.83 0.78 0.76     No results for input(s): "LACTIC" in the last 72 hours.  FEN/GI:  Recent Labs     08/16/24 0233 08/17/24 0408 08/18/24 0319    138 137   K 4.4 4.2 3.6   * 108* 105   CO2 21* 21* 22*   CALCIUM 8.1* 8.2* 9.0   ALBUMIN 2.8* 2.7* 2.6*   BILITOT 0.2 0.5 0.5   AST 22 20 23   ALKPHOS 73 81 96   ALT 20 17 25     Heme:  Recent Labs     08/16/24 0233 08/17/24 0409 08/18/24 0319   HGB 9.2* 9.0* 9.8*   HCT 29.5* 28.2* 30.3*    246 285     ID:  Recent Labs     08/16/24 0233 08/17/24 0409 08/18/24 0319   WBC 6.59 6.77 5.67     CBG:  Recent Labs     08/16/24 0233 08/17/24 0408 08/18/24 0319   GLUCOSE 130* 128* 113      Recent Labs     08/16/24  1402 08/16/24  2101 08/17/24  0632 08/17/24  1246 08/17/24  1723 08/17/24  1957 08/18/24  0531 08/18/24  1130   POCTGLUCOSE 116* 110 121* 122* 134* 135* 137* 135*      Cardiovascular:  No results for input(s): "TROPONINI", "CKTOTAL", "CKMB", "BNP" in the last 168 hours.  I have reviewed all pertinent lab results within the past 24 hours.    Imaging:  CT Head Without Contrast   Final Result      Stable areas of intracranial hemorrhage as outlined above         Electronically signed by: Hao Zheng   Date:    08/15/2024 "   Time:    10:59      CT Head Without Contrast   Final Result      1. Increased small right cerebral convexity subdural hemorrhage.   2. Similar trace left cerebral convexity subdural and subarachnoid hemorrhages.         Electronically signed by: Saranya Smith   Date:    08/15/2024   Time:    08:15      X-Ray Chest 1 View   Final Result      Improved aeration at the right lung.         Electronically signed by: Renee Oliver   Date:    08/15/2024   Time:    06:19      X-Ray Clavicle Right   Final Result      Acute fracture distal clavicle.         Electronically signed by: Lyndon Patel   Date:    08/14/2024   Time:    22:51      CTA Neck   Final Result      No hemodynamically significant stenosis or arterial injury identified.         Electronically signed by: Lyndon Patel   Date:    08/14/2024   Time:    21:44      CT 3D Rendering WO Independent Workstation   Final Result      3D osseous reconstructions.         Electronically signed by: Misael Ledesma   Date:    08/15/2024   Time:    08:38      CT Chest Abdomen Pelvis With IV Contrast (XPD) Routine   Final Result      Multiple right rib fractures with very small right pneumothorax.      Fractures of the proximal and distal portions of the right clavicle.         Electronically signed by: Misael Ledesma   Date:    08/14/2024   Time:    20:24      CT Cervical Spine Without Contrast   Final Result      No acute fracture or malalignment identified.         Electronically signed by: Lyndon Patel   Date:    08/14/2024   Time:    20:01      CT Maxillofacial Without Contrast   Final Result      1. Right periorbital soft tissue inflammations      2. No acute maxillofacial fracture identified         Electronically signed by: Lyndon Patel   Date:    08/14/2024   Time:    20:01      CT Head Without Contrast   Final Result      Left shallow subdural hematoma and small subarachnoid hemorrhage.         Electronically signed by: Lyndon Patel   Date:    08/14/2024    Time:    19:57      X-Ray Pelvis Routine AP   Final Result      No acute osseous abnormality identified.         Electronically signed by: Lyndon Patel   Date:    08/14/2024   Time:    20:38      X-Ray Chest 1 View   Final Result      As above.  Please see CT chest report for details.         Electronically signed by: Lyndon Patel   Date:    08/14/2024   Time:    22:31         I have reviewed all pertinent imaging results/findings within the past 24 hours.    Micro/Path/Other:  Microbiology Results (last 7 days)       Procedure Component Value Units Date/Time    Urine culture [[id number]]  (Abnormal)  (Susceptibility) Collected: 08/14/24 2041    Order Status: Completed Specimen: Urine Updated: 08/17/24 0639     Urine Culture >/= 100,000 colonies/ml Klebsiella pneumoniae ssp pneumoniae           Pathology Results  (Last 7 days)      None             Problems list:  Active Problem List with Overview Notes    Diagnosis Date Noted    SDH (subdural hematoma) 08/14/2024    SAH (subarachnoid hemorrhage) 08/14/2024    Closed fracture of multiple ribs of right side with routine healing 08/14/2024    Closed fracture of right clavicle with routine healing 08/14/2024    Hypertension 08/14/2024    Pneumothorax 08/14/2024        Assessment & Plan:     SAH, SDH  - Evaluated by Neurosurgery  - Keppra x 7 days  - Maintain BP < 160/90    Multiple right rib fractures, PTX  - MMPC  - Frequent IS, flutter and nebs  - Good pulmonary hygiene  - CXR 8/15 without pneumothorax    Right clavicle fracture  - Evaluated by Orthopedics  - No surgical intervention needed    Fall from height  - Cardiac diabetic diet  - Daily labs  - MMPC  - PT/OT  - Fall precautions  - Lovenox for VTE prophylaxis  - Appreciate CM assistance with SNF placement     DM  - SSI  - POC glucose AC & HS    Mandie Marroquin, AGACNP-BC, FNP-BC  Trauma Surgery  [facility]  C: [phone]

## 2024-08-18 NOTE — PLAN OF CARE
Problem: Adult Inpatient Plan of Care  Goal: Plan of Care Review  Outcome: Progressing  Goal: Absence of Hospital-Acquired Illness or Injury  Outcome: Progressing  Goal: Optimal Comfort and Wellbeing  Outcome: Progressing  Goal: Readiness for Transition of Care  Outcome: Progressing     Problem: Fall Injury Risk  Goal: Absence of Fall and Fall-Related Injury  Outcome: Progressing     Problem: Wound  Goal: Optimal Coping  Outcome: Progressing  Goal: Optimal Functional Ability  Outcome: Progressing  Goal: Absence of Infection Signs and Symptoms  Outcome: Progressing  Goal: Optimal Wound Healing  Outcome: Progressing

## 2024-08-19 VITALS
WEIGHT: 199.94 LBS | BODY MASS INDEX: 33.31 KG/M2 | TEMPERATURE: 98 F | HEART RATE: 87 BPM | DIASTOLIC BLOOD PRESSURE: 71 MMHG | HEIGHT: 65 IN | SYSTOLIC BLOOD PRESSURE: 174 MMHG | OXYGEN SATURATION: 94 % | RESPIRATION RATE: 24 BRPM

## 2024-08-19 PROBLEM — J93.9 PNEUMOTHORAX: Status: RESOLVED | Noted: 2024-08-14 | Resolved: 2024-08-19

## 2024-08-19 LAB
ALBUMIN SERPL-MCNC: 2.7 G/DL (ref 3.4–4.8)
ALBUMIN/GLOB SERPL: 0.8 RATIO (ref 1.1–2)
ALP SERPL-CCNC: 116 UNIT/L (ref 40–150)
ALT SERPL-CCNC: 35 UNIT/L (ref 0–55)
ANION GAP SERPL CALC-SCNC: 10 MEQ/L
AST SERPL-CCNC: 28 UNIT/L (ref 5–34)
BASOPHILS # BLD AUTO: 0.02 X10(3)/MCL
BASOPHILS NFR BLD AUTO: 0.4 %
BILIRUB SERPL-MCNC: 0.5 MG/DL
BUN SERPL-MCNC: 13.1 MG/DL (ref 9.8–20.1)
CALCIUM SERPL-MCNC: 8.9 MG/DL (ref 8.4–10.2)
CHLORIDE SERPL-SCNC: 105 MMOL/L (ref 98–107)
CO2 SERPL-SCNC: 20 MMOL/L (ref 23–31)
CREAT SERPL-MCNC: 0.73 MG/DL (ref 0.55–1.02)
CREAT/UREA NIT SERPL: 18
CRP SERPL-MCNC: 97.5 MG/L
EOSINOPHIL # BLD AUTO: 0.11 X10(3)/MCL (ref 0–0.9)
EOSINOPHIL NFR BLD AUTO: 2.2 %
ERYTHROCYTE [DISTWIDTH] IN BLOOD BY AUTOMATED COUNT: 13.9 % (ref 11.5–17)
GFR SERPLBLD CREATININE-BSD FMLA CKD-EPI: >60 ML/MIN/1.73/M2
GLOBULIN SER-MCNC: 3.5 GM/DL (ref 2.4–3.5)
GLUCOSE SERPL-MCNC: 99 MG/DL (ref 82–115)
HCT VFR BLD AUTO: 29.9 % (ref 37–47)
HGB BLD-MCNC: 9.5 G/DL (ref 12–16)
IMM GRANULOCYTES # BLD AUTO: 0.08 X10(3)/MCL (ref 0–0.04)
IMM GRANULOCYTES NFR BLD AUTO: 1.6 %
LYMPHOCYTES # BLD AUTO: 0.98 X10(3)/MCL (ref 0.6–4.6)
LYMPHOCYTES NFR BLD AUTO: 20 %
MCH RBC QN AUTO: 28.6 PG (ref 27–31)
MCHC RBC AUTO-ENTMCNC: 31.8 G/DL (ref 33–36)
MCV RBC AUTO: 90.1 FL (ref 80–94)
MONOCYTES # BLD AUTO: 0.31 X10(3)/MCL (ref 0.1–1.3)
MONOCYTES NFR BLD AUTO: 6.3 %
NEUTROPHILS # BLD AUTO: 3.41 X10(3)/MCL (ref 2.1–9.2)
NEUTROPHILS NFR BLD AUTO: 69.5 %
NRBC BLD AUTO-RTO: 0 %
PLATELET # BLD AUTO: 282 X10(3)/MCL (ref 130–400)
PMV BLD AUTO: 9.6 FL (ref 7.4–10.4)
POCT GLUCOSE: 110 MG/DL (ref 70–110)
POCT GLUCOSE: 122 MG/DL (ref 70–110)
POTASSIUM SERPL-SCNC: 3.7 MMOL/L (ref 3.5–5.1)
PREALB SERPL-MCNC: 15.4 MG/DL (ref 14–37)
PROT SERPL-MCNC: 6.2 GM/DL (ref 5.8–7.6)
RBC # BLD AUTO: 3.32 X10(6)/MCL (ref 4.2–5.4)
SODIUM SERPL-SCNC: 135 MMOL/L (ref 136–145)
WBC # BLD AUTO: 4.91 X10(3)/MCL (ref 4.5–11.5)

## 2024-08-19 PROCEDURE — 99900035 HC TECH TIME PER 15 MIN (STAT)

## 2024-08-19 PROCEDURE — 25000242 PHARM REV CODE 250 ALT 637 W/ HCPCS: Performed by: SURGERY

## 2024-08-19 PROCEDURE — 85025 COMPLETE CBC W/AUTO DIFF WBC: CPT | Performed by: NURSE PRACTITIONER

## 2024-08-19 PROCEDURE — 25000003 PHARM REV CODE 250: Performed by: SURGERY

## 2024-08-19 PROCEDURE — 25000003 PHARM REV CODE 250: Performed by: NURSE PRACTITIONER

## 2024-08-19 PROCEDURE — 63600175 PHARM REV CODE 636 W HCPCS: Performed by: SURGERY

## 2024-08-19 PROCEDURE — 99238 HOSP IP/OBS DSCHRG MGMT 30/<: CPT | Mod: GC,,, | Performed by: SURGERY

## 2024-08-19 PROCEDURE — 94640 AIRWAY INHALATION TREATMENT: CPT

## 2024-08-19 PROCEDURE — 27000646 HC AEROBIKA DEVICE

## 2024-08-19 PROCEDURE — 99900031 HC PATIENT EDUCATION (STAT)

## 2024-08-19 PROCEDURE — 94760 N-INVAS EAR/PLS OXIMETRY 1: CPT

## 2024-08-19 PROCEDURE — 84134 ASSAY OF PREALBUMIN: CPT | Performed by: NURSE PRACTITIONER

## 2024-08-19 PROCEDURE — 86140 C-REACTIVE PROTEIN: CPT | Performed by: NURSE PRACTITIONER

## 2024-08-19 PROCEDURE — 63600175 PHARM REV CODE 636 W HCPCS: Performed by: NURSE PRACTITIONER

## 2024-08-19 PROCEDURE — 94799 UNLISTED PULMONARY SVC/PX: CPT

## 2024-08-19 PROCEDURE — 94664 DEMO&/EVAL PT USE INHALER: CPT

## 2024-08-19 PROCEDURE — 27000221 HC OXYGEN, UP TO 24 HOURS

## 2024-08-19 PROCEDURE — 25000003 PHARM REV CODE 250

## 2024-08-19 PROCEDURE — 80053 COMPREHEN METABOLIC PANEL: CPT | Performed by: NURSE PRACTITIONER

## 2024-08-19 PROCEDURE — 36415 COLL VENOUS BLD VENIPUNCTURE: CPT | Performed by: NURSE PRACTITIONER

## 2024-08-19 RX ORDER — METHOCARBAMOL 500 MG/1
500 TABLET, FILM COATED ORAL EVERY 8 HOURS
Qty: 30 TABLET | Refills: 0 | Status: SHIPPED | OUTPATIENT
Start: 2024-08-19 | End: 2024-08-29

## 2024-08-19 RX ORDER — LIDOCAINE 50 MG/G
1 PATCH TOPICAL DAILY
Qty: 5 PATCH | Refills: 0 | Status: SHIPPED | OUTPATIENT
Start: 2024-08-19 | End: 2024-08-24

## 2024-08-19 RX ORDER — LEVETIRACETAM 500 MG/1
500 TABLET ORAL 2 TIMES DAILY
Qty: 4 TABLET | Refills: 0 | Status: SHIPPED | OUTPATIENT
Start: 2024-08-19 | End: 2024-08-21

## 2024-08-19 RX ORDER — CLOPIDOGREL BISULFATE 75 MG/1
75 TABLET ORAL DAILY
Start: 2024-08-29

## 2024-08-19 RX ADMIN — IPRATROPIUM BROMIDE AND ALBUTEROL SULFATE 3 ML: .5; 3 SOLUTION RESPIRATORY (INHALATION) at 12:08

## 2024-08-19 RX ADMIN — METHOCARBAMOL 500 MG: 500 TABLET ORAL at 05:08

## 2024-08-19 RX ADMIN — IPRATROPIUM BROMIDE AND ALBUTEROL SULFATE 3 ML: .5; 3 SOLUTION RESPIRATORY (INHALATION) at 03:08

## 2024-08-19 RX ADMIN — ENOXAPARIN SODIUM 40 MG: 40 INJECTION SUBCUTANEOUS at 08:08

## 2024-08-19 RX ADMIN — LEVETIRACETAM 500 MG: 500 TABLET, FILM COATED ORAL at 08:08

## 2024-08-19 RX ADMIN — MUPIROCIN: 20 OINTMENT TOPICAL at 08:08

## 2024-08-19 RX ADMIN — FLUOXETINE HYDROCHLORIDE 40 MG: 20 CAPSULE ORAL at 08:08

## 2024-08-19 RX ADMIN — IPRATROPIUM BROMIDE AND ALBUTEROL SULFATE 3 ML: .5; 3 SOLUTION RESPIRATORY (INHALATION) at 08:08

## 2024-08-19 RX ADMIN — Medication: at 08:08

## 2024-08-19 RX ADMIN — LIDOCAINE 2 PATCH: 700 PATCH TOPICAL at 08:08

## 2024-08-19 RX ADMIN — POLYETHYLENE GLYCOL 3350 17 G: 17 POWDER, FOR SOLUTION ORAL at 08:08

## 2024-08-19 RX ADMIN — PRAMIPEXOLE DIHYDROCHLORIDE 3 MG: 1 TABLET ORAL at 08:08

## 2024-08-19 RX ADMIN — OXYCODONE HYDROCHLORIDE 5 MG: 5 TABLET ORAL at 04:08

## 2024-08-19 RX ADMIN — WHITE PETROLATUM: 1.75 OINTMENT TOPICAL at 08:08

## 2024-08-19 RX ADMIN — METHOCARBAMOL 500 MG: 500 TABLET ORAL at 01:08

## 2024-08-19 RX ADMIN — HYDRALAZINE HYDROCHLORIDE 10 MG: 20 INJECTION INTRAMUSCULAR; INTRAVENOUS at 11:08

## 2024-08-19 RX ADMIN — ACETAMINOPHEN 325MG 650 MG: 325 TABLET ORAL at 01:08

## 2024-08-19 RX ADMIN — DOCUSATE SODIUM 100 MG: 100 CAPSULE, LIQUID FILLED ORAL at 08:08

## 2024-08-19 RX ADMIN — OXYCODONE HYDROCHLORIDE 5 MG: 5 TABLET ORAL at 01:08

## 2024-08-19 RX ADMIN — VALSARTAN 320 MG: 80 TABLET, FILM COATED ORAL at 08:08

## 2024-08-19 RX ADMIN — PANTOPRAZOLE SODIUM 40 MG: 40 TABLET, DELAYED RELEASE ORAL at 08:08

## 2024-08-19 RX ADMIN — OXYCODONE HYDROCHLORIDE 5 MG: 5 TABLET ORAL at 08:08

## 2024-08-19 RX ADMIN — MICONAZOLE NITRATE: 20 POWDER TOPICAL at 08:08

## 2024-08-19 RX ADMIN — LEVOTHYROXINE SODIUM 137 MCG: 25 TABLET ORAL at 05:08

## 2024-08-19 NOTE — PLAN OF CARE
Problem: Adult Inpatient Plan of Care  Goal: Plan of Care Review  Outcome: Progressing  Goal: Absence of Hospital-Acquired Illness or Injury  Outcome: Progressing  Goal: Optimal Comfort and Wellbeing  Outcome: Progressing  Goal: Readiness for Transition of Care  Outcome: Progressing     Problem: Skin Injury Risk Increased  Goal: Skin Health and Integrity  Outcome: Progressing     Problem: Fall Injury Risk  Goal: Absence of Fall and Fall-Related Injury  Outcome: Progressing     Problem: Wound  Goal: Optimal Coping  Outcome: Progressing  Goal: Absence of Infection Signs and Symptoms  Outcome: Progressing  Goal: Skin Health and Integrity  Outcome: Progressing  Goal: Optimal Wound Healing  Outcome: Progressing

## 2024-08-19 NOTE — DISCHARGE SUMMARY
[facility]  Trauma Surgery  Discharge Summary      Patient Name: Cata Gasca  MRN: [id number]  Admission Date: 8/14/2024  Hospital Length of Stay: 5 days  Discharge Date and Time:  08/19/2024 1:05 PM  Attending Physician: Дмитрий Stanley MD  Discharging Provider: Mandie Marroquin NP  Primary Care Provider: It[facility] Primary Doctor    HPI:   Patient is an approximately 81 year old female who fell off her porch approximately 2 feet per EMS, states + LOC. Pt laid on the ground for an unknown amount of time attempting to draw attention to herself, comes in wet from water hose. C/O right shoulder pain, no deformity appreciated, abrasion to right posterior upper arm. Right periorbital bruising with subconjunctival hemorrhage. C/O tenderness to thoracic area upon palpitation. Bilateral lower legs swollen from what appears to be venous stasis, dressing to left lower leg.     * No surgery found *      Indwelling Lines/Drains at time of discharge:   Lines/Drains/Airways       Drain  Duration             Female External Urinary Catheter w/ Suction 08/14/24 2200 4 days                  Hospital Course: Cata Gasca is a 84 year old female who presented following a fall from her porch. She sustained SDH, SAH, right 1-7 rib fractures, right clavicle fracture and a tiny right pneumothorax. She was admitted to the TICU for close neurological monitoring. Neurosurgery and Orthopedics were consulted. Neurosurgery recommended Keppra for 7 days and holding Plavix for two weeks. Repeat CT stable. Will follow up outpatient in 4 weeks. Evaluated by Orthopedics, non-operative management of clavicle fracture. Will need repeat XR in 2 weeks. She worked with therapy and was weaned from oxygen. She will be discharge to SNF.     Goals of Care Treatment Preferences:  Code Status: Full Code      Consults:   Consults (From admission, onward)          Status Ordering Provider     Inpatient consult to Neurosurgery  Once         Provider:  Rasheed Irving MD    Completed KEATON DONNELLY            Significant Diagnostic Studies: Labs: CMP   Recent Labs   Lab 08/18/24  0319 08/19/24  0246    135*   K 3.6 3.7    105   CO2 22* 20*   BUN 12.9 13.1   CREATININE 0.76 0.73   CALCIUM 9.0 8.9   ALBUMIN 2.6* 2.7*   BILITOT 0.5 0.5   ALKPHOS 96 116   AST 23 28   ALT 25 35    and CBC   Recent Labs   Lab 08/18/24 0319 08/19/24  0246   WBC 5.67 4.91   HGB 9.8* 9.5*   HCT 30.3* 29.9*    282       Pending Diagnostic Studies:       None          Final Active Diagnoses:    Diagnosis Date Noted POA    PRINCIPAL PROBLEM:  SDH (subdural hematoma) [S06.5XAA] 08/14/2024 Yes    SAH (subarachnoid hemorrhage) [I60.9] 08/14/2024 Yes    Closed fracture of multiple ribs of right side with routine healing [S22.41XD] 08/14/2024 Not Applicable    Closed fracture of right clavicle with routine healing [S42.001D] 08/14/2024 Not Applicable    Hypertension [I10] 08/14/2024 Yes      Problems Resolved During this Admission:    Diagnosis Date Noted Date Resolved POA    Pneumothorax [J93.9] 08/14/2024 08/19/2024 Yes      Discharged Condition: good    Disposition: Skilled Nursing Facility    Follow Up:   Follow-up Information       Jonathan Levine MD. Schedule an appointment as soon as possible for a visit in 2 week(s).    Specialty: Orthopedic Surgery  Why: Follow up clavicle XR  Contact information:  [address] [place] [address] [phone]               Rasheed Irving MD. Schedule an appointment as soon as possible for a visit in 4 week(s).    Specialty: Neurosurgery  Why: Follow up with DOT clinic.  Contact information:  [address] [place] [address] [address]  [facility] [address] [phone]                           Patient Instructions:   No discharge procedures on file.  Medications:  Reconciled Home Medications:      Medication List        START taking these medications      levETIRAcetam 500 MG Tab  Commonly known as:  KEPPRA  Take 1 tablet (500 mg total) by mouth 2 (two) times daily. for 2 days     LIDOcaine 5 %  Commonly known as: LIDODERM  Place 1 patch onto the skin once daily. Remove & Discard patch within 12 hours or as directed by MD for 5 days     methocarbamoL 500 MG Tab  Commonly known as: ROBAXIN  Take 1 tablet (500 mg total) by mouth every 8 (eight) hours. for 10 days            CHANGE how you take these medications      clopidogreL 75 mg tablet  Commonly known as: PLAVIX  Take 1 tablet (75 mg total) by mouth once daily.  Start taking on: August 29, 2024  What changed: These instructions start on August 29, 2024. If you are unsure what to do until then, ask your doctor or other care provider.            CONTINUE taking these medications      azelastine 137 mcg (0.1 %) nasal spray  Commonly known as: ASTELIN  1 spray by Nasal route 2 (two) times daily.     FLUoxetine 40 MG capsule  Take 40 mg by mouth once daily.     HYDROcodone-acetaminophen 5-325 mg per tablet  Commonly known as: NORCO  Take 1 tablet by mouth every 4 (four) hours as needed for Pain.     levothyroxine 137 MCG Tab tablet  Commonly known as: SYNTHROID  Take 1 tablet by mouth before breakfast.     lovastatin 40 MG tablet  Commonly known as: MEVACOR  Take 40 mg by mouth every evening.     metFORMIN 750 MG ER 24hr tablet  Commonly known as: GLUCOPHAGE-XR  Take 750 mg by mouth 2 (two) times daily with meals.     nabumetone 500 MG tablet  Commonly known as: RELAFEN  Take 500 mg by mouth 2 (two) times daily as needed for Pain.     pantoprazole 40 MG tablet  Commonly known as: PROTONIX  Take 40 mg by mouth once daily.     pramipexole 1.5 MG tablet  Commonly known as: MIRAPEX  Take 2 tablets by mouth once daily.     pregabalin 150 MG capsule  Commonly known as: LYRICA  Take 150 mg by mouth every evening.     valsartan 320 MG tablet  Commonly known as: DIOVAN  Take 320 mg by mouth once daily.            Time spent on the discharge of patient: 18  minutes    Mandie Marroquin, AGACNP-BC, FNP-BC  Trauma Surgery  [facility]

## 2024-08-19 NOTE — NURSING
Report called to Yamile SALMON at rehab side of   [facility], requested patient to have enema with result before transfer due to no bowel in 3 days, order placed per NP and administered with results charted and called results to rehab, [facility] notified and patient off of on call status estimated time of arrival for ambulance 2-3 hours. Family notified and no additional questions noted

## 2024-08-19 NOTE — PLAN OF CARE
SSC spoke with Geovanna @ [facility], who states they have accept pt and can admit today. All DC information sent to [facility] via [facility]. Pt will transport via [facility] per RN. DC packet given to nurse for report.

## 2024-08-19 NOTE — PROGRESS NOTES
Trauma Surgery   Progress Note  Admit Date: 8/14/2024  HD#5  POD#* No surgery found *    Subjective:   Interval history:  Afebrile, VSS on NC 2L. Patient resting, easily awakens. Awaiting SNF placement.      Home Meds:   Current Outpatient Medications   Medication Instructions    azelastine (ASTELIN) 137 mcg (0.1 %) nasal spray 1 spray, Nasal, 2 times daily    clopidogreL (PLAVIX) 75 mg, Oral, Daily    FLUoxetine 40 mg, Oral, Daily    HYDROcodone-acetaminophen (NORCO) 5-325 mg per tablet 1 tablet, Oral, Every 4 hours PRN    levothyroxine (SYNTHROID) 137 MCG Tab tablet 1 tablet, Oral, Before breakfast    lovastatin (MEVACOR) 40 mg, Oral, Nightly    metFORMIN (GLUCOPHAGE-XR) 750 mg, Oral, 2 times daily with meals    nabumetone (RELAFEN) 500 mg, Oral, 2 times daily PRN    pantoprazole (PROTONIX) 40 mg, Oral, Daily    pramipexole (MIRAPEX) 1.5 MG tablet 2 tablets, Oral, Daily    pregabalin (LYRICA) 150 mg, Oral, Nightly    valsartan (DIOVAN) 320 mg, Oral, Daily      Scheduled Meds:   acetaminophen  650 mg Oral Q4H    albuterol-ipratropium  3 mL Nebulization Q4H    docusate sodium  100 mg Oral BID    enoxparin  40 mg Subcutaneous Q12H (prophylaxis, 0900/2100)    FLUoxetine  40 mg Oral Daily    levETIRAcetam  500 mg Oral BID    levothyroxine  137 mcg Oral Before breakfast    LIDOcaine  2 patch Transdermal Q24H    methocarbamoL  500 mg Oral Q8H    miconazole NITRATE 2 %   Topical (Top) BID    mupirocin   Nasal BID    pantoprazole  40 mg Oral Daily    polyethylene glycol  17 g Oral BID    pramipexole  3 mg Oral Daily    pregabalin  150 mg Oral QHS    valsartan  320 mg Oral Daily    white petrolatum   Topical (Top) Daily     Continuous Infusions:  PRN Meds:  Current Facility-Administered Medications:     bisacodyL, 10 mg, Rectal, Daily PRN    dextrose 10%, 12.5 g, Intravenous, PRN    dextrose 10%, 25 g, Intravenous, PRN    diphenhydrAMINE-zinc acetate 1-0.1%, , Topical (Top), TID PRN    glucagon (human recombinant), 1 mg,  "Intramuscular, PRN    glucose, 16 g, Oral, PRN    glucose, 24 g, Oral, PRN    hydrALAZINE, 10 mg, Intravenous, Q6H PRN    insulin aspart U-100, 0-10 Units, Subcutaneous, QID (AC + HS) PRN    melatonin, 6 mg, Oral, Nightly PRN    oxyCODONE, 5 mg, Oral, Q4H PRN     Objective:     VITAL SIGNS: 24 HR MIN & MAX LAST   Temp  Min: 97.7 °F (36.5 °C)  Max: 98.6 °F (37 °C)  98.2 °F (36.8 °C)   BP  Min: 140/61  Max: 200/82  (!) 156/76    Pulse  Min: 70  Max: 93  92    Resp  Min: 12  Max: 24  17    SpO2  Min: 90 %  Max: 99 %  97 %      HT: 5' 5" (165.1 cm)  WT: 90.7 kg (199 lb 15.3 oz)  BMI: 33.3     Intake/output:  Intake/Output - Last 3 Shifts         08/17 0700 08/18 0659 08/18 0700 08/19 0659 08/19 0700 08/20 0659    P.O. 880 1080     IV Piggyback 85.5      Total Intake(mL/kg) 965.5 (10.6) 1080 (11.9)     Urine (mL/kg/hr) 1300 (0.6) 500 (0.2)     Total Output 1300 500     Net -334.5 +580            Urine Occurrence  2 x             Intake/Output Summary (Last 24 hours) at 8/19/2024 0718  Last data filed at 8/18/2024 1732  Gross per 24 hour   Intake 1080 ml   Output 500 ml   Net 580 ml         Lines/drains/airway:       Peripheral IV - Single Lumen 08/14/24 1910 20 G Left Antecubital (Active)   Site Assessment Clean;Intact;Dry 08/17/24 0400   Extremity Assessment Distal to IV No abnormal discoloration;No redness;No swelling;No warmth 08/16/24 0731   Line Status Infusing 08/17/24 0400   Dressing Status Intact;Clean;Dry 08/17/24 0400   Dressing Intervention Integrity maintained 08/17/24 0400   Number of days: 2            Peripheral IV - Single Lumen 08/14/24 2020 20 G Right Antecubital (Active)   Site Assessment Clean;Dry;Intact 08/17/24 0400   Extremity Assessment Distal to IV No abnormal discoloration;No redness;No swelling;No warmth 08/16/24 0731   Line Status Infusing 08/17/24 0400   Dressing Status Clean;Dry;Intact 08/17/24 0400   Dressing Intervention Integrity maintained 08/17/24 0400   Number of days: 2     " "  Female External Urinary Catheter w/ Suction 08/14/24 2200 (Active)   Skin no redness;no breakdown 08/16/24 0731   Tolerance no signs/symptoms of discomfort 08/16/24 0731   Suction Continuous suction at 70 mmHg 08/16/24 0731   Output (mL) 500 mL 08/16/24 2000   Number of days: 2       Physical examination:  Gen: NAD, AAOx3, answering questions appropriately  HEENT: Normocephalic, bruising to right periorbital region  CV: RR  Resp: NWOB on NC  Abd: S/NT/ND  Msk: moving all extremities spontaneously and purposefully, pain with movement of RUE  Neuro: CN II-XII grossly intact  Skin/wounds: warm, dry    Labs:  Renal:  Recent Labs     08/17/24 0408 08/18/24 0319 08/19/24  0246   BUN 11.2 12.9 13.1   CREATININE 0.78 0.76 0.73     No results for input(s): "LACTIC" in the last 72 hours.  FEN/GI:  Recent Labs     08/17/24 0408 08/18/24 0319 08/19/24  0246    137 135*   K 4.2 3.6 3.7   * 105 105   CO2 21* 22* 20*   CALCIUM 8.2* 9.0 8.9   ALBUMIN 2.7* 2.6* 2.7*   BILITOT 0.5 0.5 0.5   AST 20 23 28   ALKPHOS 81 96 116   ALT 17 25 35     Heme:  Recent Labs     08/17/24 0409 08/18/24 0319 08/19/24  0246   HGB 9.0* 9.8* 9.5*   HCT 28.2* 30.3* 29.9*    285 282     ID:  Recent Labs     08/17/24 0409 08/18/24  0319 08/19/24  0246   WBC 6.77 5.67 4.91     CBG:  Recent Labs     08/17/24 0408 08/18/24 0319 08/19/24  0246   GLUCOSE 128* 113 99      Recent Labs     08/16/24  2101 08/17/24  0632 08/17/24  1246 08/17/24  1723 08/17/24  1957 08/18/24  0531 08/18/24  1130 08/18/24  1646   POCTGLUCOSE 110 121* 122* 134* 135* 137* 135* 121*      Cardiovascular:  No results for input(s): "TROPONINI", "CKTOTAL", "CKMB", "BNP" in the last 168 hours.  I have reviewed all pertinent lab results within the past 24 hours.    Imaging:  CT Head Without Contrast   Final Result      Stable areas of intracranial hemorrhage as outlined above         Electronically signed by: Hao Zheng   Date:    08/15/2024   Time:    10:59    "   CT Head Without Contrast   Final Result      1. Increased small right cerebral convexity subdural hemorrhage.   2. Similar trace left cerebral convexity subdural and subarachnoid hemorrhages.         Electronically signed by: Saranya Smith   Date:    08/15/2024   Time:    08:15      X-Ray Chest 1 View   Final Result      Improved aeration at the right lung.         Electronically signed by: Renee Oliver   Date:    08/15/2024   Time:    06:19      X-Ray Clavicle Right   Final Result      Acute fracture distal clavicle.         Electronically signed by: Lyndon Patel   Date:    08/14/2024   Time:    22:51      CTA Neck   Final Result      No hemodynamically significant stenosis or arterial injury identified.         Electronically signed by: Lyndon Patel   Date:    08/14/2024   Time:    21:44      CT 3D Rendering WO Independent Workstation   Final Result      3D osseous reconstructions.         Electronically signed by: Misael Ledesma   Date:    08/15/2024   Time:    08:38      CT Chest Abdomen Pelvis With IV Contrast (XPD) Routine   Final Result      Multiple right rib fractures with very small right pneumothorax.      Fractures of the proximal and distal portions of the right clavicle.         Electronically signed by: Misael Ledesma   Date:    08/14/2024   Time:    20:24      CT Cervical Spine Without Contrast   Final Result      No acute fracture or malalignment identified.         Electronically signed by: Lyndon Patel   Date:    08/14/2024   Time:    20:01      CT Maxillofacial Without Contrast   Final Result      1. Right periorbital soft tissue inflammations      2. No acute maxillofacial fracture identified         Electronically signed by: Lyndon Patel   Date:    08/14/2024   Time:    20:01      CT Head Without Contrast   Final Result      Left shallow subdural hematoma and small subarachnoid hemorrhage.         Electronically signed by: Lyndon Patel   Date:    08/14/2024   Time:    19:57      X-Ray  Pelvis Routine AP   Final Result      No acute osseous abnormality identified.         Electronically signed by: Lyndon Patel   Date:    08/14/2024   Time:    20:38      X-Ray Chest 1 View   Final Result      As above.  Please see CT chest report for details.         Electronically signed by: Lyndon Patel   Date:    08/14/2024   Time:    22:31         I have reviewed all pertinent imaging results/findings within the past 24 hours.    Micro/Path/Other:  Microbiology Results (last 7 days)       Procedure Component Value Units Date/Time    Urine culture [[id number]]  (Abnormal)  (Susceptibility) Collected: 08/14/24 2041    Order Status: Completed Specimen: Urine Updated: 08/17/24 0639     Urine Culture >/= 100,000 colonies/ml Klebsiella pneumoniae ssp pneumoniae           Pathology Results  (Last 7 days)      None             Problems list:  Active Problem List with Overview Notes    Diagnosis Date Noted    SDH (subdural hematoma) 08/14/2024    SAH (subarachnoid hemorrhage) 08/14/2024    Closed fracture of multiple ribs of right side with routine healing 08/14/2024    Closed fracture of right clavicle with routine healing 08/14/2024    Hypertension 08/14/2024    Pneumothorax 08/14/2024        Assessment & Plan:     SAH, SDH  - Evaluated by Neurosurgery  - Keppra x 7 days  - Maintain BP < 160/90    Multiple right rib fractures, PTX  - MMPC  - Frequent IS, flutter and nebs  - Good pulmonary hygiene  - CXR 8/15 without pneumothorax  - Wean off oxygen as able    Right clavicle fracture  - Evaluated by Orthopedics  - No surgical intervention needed    Fall from height  - Cardiac diabetic diet  - M/Th labs  - MMPC  - PT/OT  - Fall precautions  - Lovenox for VTE prophylaxis  - Appreciate CM assistance with SNF placement     DM  - SSI  - POC glucose AC & HS    Mandie Marroquin, AGACNP-BC, FNP-BC  Trauma Surgery  [facility]  C: [phone]

## 2024-08-20 ENCOUNTER — TELEPHONE (OUTPATIENT)
Dept: NEUROSURGERY | Facility: CLINIC | Age: 84
End: 2024-08-20
Payer: MEDICARE

## 2024-08-20 DIAGNOSIS — S06.5XAA SDH (SUBDURAL HEMATOMA): Primary | ICD-10-CM

## 2024-08-20 NOTE — TELEPHONE ENCOUNTER
I called patient to schedule hospital follow up per Dr. Anthony in 4 weeks with an DOT and repeat CT Head. I spoke to patient's daughter Priscila and scheduled the patient with DOT Samantha ON 9/17/24 AT 12:00. Patient requested [facility] for the CT Head I faxed order to [facility] and also advised patient's daughter Priscila that they would call her to schedule an appointment. Priscila verbalized understanding and was complaint with date and time of appointment.

## 2024-09-16 ENCOUNTER — TELEPHONE (OUTPATIENT)
Dept: NEUROSURGERY | Facility: CLINIC | Age: 84
End: 2024-09-16
Payer: MEDICARE

## 2024-09-16 NOTE — TELEPHONE ENCOUNTER
I've tried calling patient 2x to see if CT was completed at [facility] with no success, I will set a reminder to myself to try one more time. I cx appt in the meantime.